# Patient Record
Sex: MALE | Race: WHITE
[De-identification: names, ages, dates, MRNs, and addresses within clinical notes are randomized per-mention and may not be internally consistent; named-entity substitution may affect disease eponyms.]

---

## 2018-06-27 ENCOUNTER — HOSPITAL ENCOUNTER (OUTPATIENT)
Dept: HOSPITAL 75 - CARD | Age: 78
LOS: 90 days | Discharge: HOME | End: 2018-09-25
Attending: INTERNAL MEDICINE
Payer: MEDICARE

## 2018-06-27 DIAGNOSIS — I45.10: Primary | ICD-10-CM

## 2018-06-27 DIAGNOSIS — I44.4: ICD-10-CM

## 2018-06-27 DIAGNOSIS — R42: ICD-10-CM

## 2018-06-27 PROCEDURE — 93225 XTRNL ECG REC<48 HRS REC: CPT

## 2018-06-27 PROCEDURE — 93226 XTRNL ECG REC<48 HR SCAN A/R: CPT

## 2018-07-19 ENCOUNTER — HOSPITAL ENCOUNTER (OUTPATIENT)
Dept: HOSPITAL 75 - SLEEP | Age: 78
Discharge: HOME | End: 2018-07-19
Attending: FAMILY MEDICINE
Payer: MEDICARE

## 2018-07-19 DIAGNOSIS — I10: ICD-10-CM

## 2018-07-19 DIAGNOSIS — R06.83: ICD-10-CM

## 2018-07-19 DIAGNOSIS — I49.9: ICD-10-CM

## 2018-07-19 DIAGNOSIS — G47.10: Primary | ICD-10-CM

## 2018-08-17 ENCOUNTER — HOSPITAL ENCOUNTER (OUTPATIENT)
Dept: HOSPITAL 75 - CATH | Age: 78
Discharge: HOME | End: 2018-08-17
Attending: INTERNAL MEDICINE
Payer: MEDICARE

## 2018-08-17 VITALS — WEIGHT: 211 LBS | HEIGHT: 72 IN | BODY MASS INDEX: 28.58 KG/M2

## 2018-08-17 VITALS — DIASTOLIC BLOOD PRESSURE: 89 MMHG | SYSTOLIC BLOOD PRESSURE: 146 MMHG

## 2018-08-17 DIAGNOSIS — I10: ICD-10-CM

## 2018-08-17 DIAGNOSIS — R94.31: ICD-10-CM

## 2018-08-17 DIAGNOSIS — J44.9: ICD-10-CM

## 2018-08-17 DIAGNOSIS — I45.2: ICD-10-CM

## 2018-08-17 DIAGNOSIS — R00.2: ICD-10-CM

## 2018-08-17 DIAGNOSIS — G47.33: ICD-10-CM

## 2018-08-17 DIAGNOSIS — Z79.01: ICD-10-CM

## 2018-08-17 DIAGNOSIS — R42: ICD-10-CM

## 2018-08-17 DIAGNOSIS — I48.92: Primary | ICD-10-CM

## 2018-08-17 DIAGNOSIS — I65.23: ICD-10-CM

## 2018-08-17 PROCEDURE — 33282: CPT

## 2018-08-17 NOTE — XMS REPORT
Encounter Summary

 Created on: 2018



Wachter, Ariel SUSHIL Laboy

External Reference #: [id number]

: 1940

Sex: Male



Demographics







 Address  [address] [place] [address] 

[place]  [address]

 

 Home Phone  [phone]

 

 Preferred Language  English

 

 Marital Status  Unknown

 

 Tenriism Affiliation  CAT

 

 Race  White

 

 Ethnic Group  Not  or 





Author







 Author  [facility]

 

 Organization  [facility]

 

 Address  Unknown

 

 Phone  Unavailable







Support







 Name  Relationship  Address  Phone

 

 Wachter,Mary Susan  ECON  Unknown  [phone]







Care Team Providers







 Care Team Member Name  Role  Phone

 

 SUSHIL Breen MD  Unavailable  [phone]

 

 Walt Malagon PA-C  Unavailable  [phone]

 

 Katt Garcia MD  PCP  [phone]

 

 Yasmine Caballero MD  Unavailable  [phone]







Encounter Details







    



  Date   Type   Department   Care Team   Description

 

    



  2018   Orders Only   [facility]   Tere Son MBBS   
Chronic kidney disease,



    Physicians - Internal   [address]   unspecified CKD stage



    Medicine   [address] 



    [place] [address] 



    [facility] [address]   [phone] 



    [address]   [phone] (Fax) 



    [place]  



    [address] [phone]  







Social History







    



  Tobacco Use   Types   Packs/Day   Years Used   Date

 

    



  Never Smoker   Cigars   

 

    



  Smokeless Tobacco: Former   Chew    Quit:



  User     2010









 Comments: Rare cigar w/ golf game









   



  Alcohol Use   Drinks/Week   oz/Week   Comments

 

   



  Yes   15 Standard   7.5 



   drinks or  



   equivalent  









 



  Sex Assigned at Birth   Date Recorded

 

 



  Not on file 



as of this encounter



Plan of Treatment





Not on fileas of this encounter



Results

* BASIC METABOLIC PANEL (2018)





   



  Component   Value   Ref Range   Performed At

 

   



  Sodium   140    [facility]

 

   



  Potassium   4.1    [facility]

 

   



  Chloride   105    [facility]

 

   



  CO2   27    [facility]

 

   



  Blood Urea Nitrogen   26    [facility]

 

   



  Creatinine   1.6 (H)    [facility]

 

   



  Glucose   86    [facility]

 

   



  Calcium   8.8    [facility]

 

   



  eGFR Non    45 (L)    [facility]

 

   



  eGFR      [facility]

 

   



  Anion Gap     [facility]













  Specimen

 





  Blood - Blood









 



  Narrative   Performed At

 

 



  This result has an attachment that is not available. 









   



  Performing Organization   Address   City/State/Zipcode   Phone Number

 

   



  [facility]   





in this encounter



Visit Diagnoses











  Diagnosis

 





  Chronic kidney disease, unspecified CKD stage

## 2018-08-17 NOTE — XMS REPORT
Continuity of Care Document

 Created on: 2018



WACHTER JR, ANDREW J

External Reference #: [id number]

: 1940

Sex: Male



Demographics







 Address  [address] [place]  [address]

 

 Home Phone  [phone] x

 

 Preferred Language  Unknown

 

 Marital Status  Unknown

 

 Zoroastrianism Affiliation  Unknown

 

 Race  Unknown

 

 Ethnic Group  Unknown





Author







 Author  [facility]

 

 Organization  [facility]

 

 Address  Unknown

 

 Phone  Unavailable



              



Allergies

      





 Active            Description            Code            Type            
Severity            Reaction            Onset            Reported/Identified   
         Relationship to Patient            Clinical Status        

 

 Yes            No Known Drug Allergies            [id number]            Drug 
Allergy            Unknown            N/A                         2016   
                               



                  



Medications

      



There is no data.                  



Problems

      





 Date Dx Coded            Attending            Type            Code            
Diagnosis            Diagnosed By        

 

 2009                         Ot            569.0            ANAL   
RECTAL POLYP                     

 

 2009                         Ot            V67.09            SURGERY 
FOLLOW-UP, OTHER SURGERY                     

 

 2014            NEELA RUANO MD            Ot            185         
   MALIGN NEOPL PROSTATE                     

 

 2014            NEELA RUANO MD            Ot            V16.0       
     FAMILY HX-GI MALIGNANCY                     

 

 2014            NEELA RUANO MD            Ot            V76.51      
      SCREEN MAL NEOP-COLON                     

 

 2014            MEENA NG, DUANE E            Ot            185          
  MALIGN NEOPL PROSTATE                     

 

 2016                         Ot            185            MALIGN NEOPL 
PROSTATE                     

 

 2016            NICKI NG, VERONIQUE MARTINEZ            Ot            L98.9     
       DISORDER OF THE SKIN AND SUBCUTANEOUS TI                     

 

 2016            NICKI GN, VERONIQUE MARTINEZ            Ot            Z01.818   
         ENCOUNTER FOR OTHER PREPROCEDURAL EXAMIN                     

 

 2016                         Ot            275.40            CALCIUM 
METAB DISORD,NOS                     

 

 2016                         Ot            285.21            ANEMIA IN 
CHRONIC KIDNEY DISEASE                     

 

 2016                         Ot            585.3            CHRONIC 
KIDNEY DISEASE, STAGE III (MODER                     

 

 2016                         Ot            599.70            HEMATURIA, 
UNSPECIFIED                     

 

 2016            TAWIL MD, ELIAS A            Ot            185          
  MALIGN NEOPL PROSTATE                     

 

 2016            TAWIL MD, ELIAS A            Ot            573.8        
    LIVER DISORDERS NEC                     

 

 2016            TAWIL MD, ELIAS A            Ot            753.10       
     CYSTIC KIDNEY DISEASE, UNSPECIFIED                     

 

 2016            TAWIL MD, ELIAS A            Ot            V81.5        
    SCREEN FOR NEPHROPATHY                     

 

 2016            NEELA RUANO MD            Ot            V72.84      
      EXAM PRE-OPERATIVE NOS                     

 

 2016                         Ot            185            MALIGN NEOPL 
PROSTATE                     

 

 2016            NICKI NG, VERONIQUE MARTINEZ            Ot            C44.42    
        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                     

 

 2016            NICKI NG, VERONIQUE MARTINEZ            Ot            L57.0     
       ACTINIC KERATOSIS                     

 

 2016            NICKI NG, VERONIQUE MARTINEZ            Ot            L82.1     
       OTHER SEBORRHEIC KERATOSIS                     

 

 2016            NICKI NG, VERONIQUE MARTINEZ            Ot            C44.42    
        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                     

 

 2016            NICKI NG, VERONIQUE MARTINEZ            Ot            L82.1     
       OTHER SEBORRHEIC KERATOSIS                     

 

 11/10/2016            DOMINIQUE TANG MD            Ot            I44.4       
     LEFT ANTERIOR FASCICULAR BLOCK                     

 

 11/10/2016            DOMINIQUE TANG MD            Ot            I45.10      
      UNSPECIFIED RIGHT BUNDLE-BRANCH BLOCK                     

 

 11/10/2016            DOMINIQUE TANG MD            Ot            I95.9       
     HYPOTENSION, UNSPECIFIED                     

 

 11/10/2016            DOMINIQUE TANG MD            Ot            R94.31      
      ABNORMAL ELECTROCARDIOGRAM [ECG] [EKG]                     

 

 11/10/2016            DOMINIQUE TANG MD            Ot            I44.4       
     LEFT ANTERIOR FASCICULAR BLOCK                     

 

 11/10/2016            DOMINIQUE TANG MD            Ot            I45.10      
      UNSPECIFIED RIGHT BUNDLE-BRANCH BLOCK                     

 

 11/10/2016            DOMINIQUE TANG MD            Ot            I95.9       
     HYPOTENSION, UNSPECIFIED                     

 

 11/10/2016            DOMINIQUE TANG MD            Ot            R94.31      
      ABNORMAL ELECTROCARDIOGRAM [ECG] [EKG]                     

 

 2016            DOMINIQUE TANG MD            Ot            I44.4       
     LEFT ANTERIOR FASCICULAR BLOCK                     

 

 2016            DOMINIQUE TANG MD            Ot            I45.10      
      UNSPECIFIED RIGHT BUNDLE-BRANCH BLOCK                     

 

 2016            DOMINIQUE TANG MD            Ot            I95.9       
     HYPOTENSION, UNSPECIFIED                     

 

 2016            DOMINIQUE TANG MD            Ot            R94.31      
      ABNORMAL ELECTROCARDIOGRAM [ECG] [EKG]                     

 

 2016            DOMINIQUE TANG MD            Ot            I44.4       
     LEFT ANTERIOR FASCICULAR BLOCK                     

 

 2016            DOMINIQUE TANG MD            Ot            I45.10      
      UNSPECIFIED RIGHT BUNDLE-BRANCH BLOCK                     

 

 2016            DOMINIQUE TANG MD            Ot            I95.9       
     HYPOTENSION, UNSPECIFIED                     

 

 2016            DOMINIQUE TANG MD            Ot            R94.31      
      ABNORMAL ELECTROCARDIOGRAM [ECG] [EKG]                     

 

 2018                         Ot            275.40            CALCIUM 
METAB DISORD,NOS                     

 

 2018                         Ot            285.21            ANEMIA IN 
CHRONIC KIDNEY DISEASE                     

 

 2018                         Ot            585.3            CHRONIC 
KIDNEY DISEASE, STAGE III (MODER                     

 

 2018                         Ot            599.70            HEMATURIA, 
UNSPECIFIED                     

 

 2018            TAWIL MD, ELIAS A            Ot            185          
  MALIGN NEOPL PROSTATE                     

 

 2018            TAWIL MD, ELIAS A            Ot            573.8        
    LIVER DISORDERS NEC                     

 

 2018            TAWIL MD, ELIAS A            Ot            753.10       
     CYSTIC KIDNEY DISEASE, UNSPECIFIED                     

 

 2018            TAWIL MD, ELIAS A            Ot            V81.5        
    SCREEN FOR NEPHROPATHY                     

 

 2018            ALDEN NG, NEELA SALVADOR            Ot            V72.84      
      EXAM PRE-OPERATIVE NOS                     

 

 2018                         Ot            185            MALIGN NEOPL 
PROSTATE                     

 

 2018            DOMINIQUE TANG MD            Ot            I44.4       
     LEFT ANTERIOR FASCICULAR BLOCK                     

 

 2018            DOMINIQUE TANG MD            Ot            I45.10      
      UNSPECIFIED RIGHT BUNDLE-BRANCH BLOCK                     

 

 2018            DOMINIQUE TANG MD            Ot            I95.9       
     HYPOTENSION, UNSPECIFIED                     

 

 2018            DOMINIQUE TANG MD            Ot            R94.31      
      ABNORMAL ELECTROCARDIOGRAM [ECG] [EKG]                     

 

 2018            DIMITRI FARRAR MD            Ot            G47.10    
        HYPERSOMNIA, UNSPECIFIED                     

 

 2018            DIMITRI FARRAR MD            Ot            I10       
     ESSENTIAL (PRIMARY) HYPERTENSION                     

 

 2018            DIMITRI FARRAR MD            Ot            I49.9     
       CARDIAC ARRHYTHMIA, UNSPECIFIED                     

 

 2018            DIMITRI FARRAR MD            Ot            R06.83    
        SNORING                     

 

 2018            DOMINIQUE TANG MD            Ot            I44.4       
     LEFT ANTERIOR FASCICULAR BLOCK                     

 

 2018            DOMINIQUE TANG MD            Ot            I45.10      
      UNSPECIFIED RIGHT BUNDLE-BRANCH BLOCK                     

 

 2018            DOMINIQUE TANG MD            Ot            R42         
   DIZZINESS AND GIDDINESS                     

 

 2018            DOMINIQUE TANG MD            Ot            I08.1       
     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                     

 

 2018            DOMINIQUE TANG MD            Ot            I44.4       
     LEFT ANTERIOR FASCICULAR BLOCK                     

 

 2018            DOMINIQUE TANG MD            Ot            I45.10      
      UNSPECIFIED RIGHT BUNDLE-BRANCH BLOCK                     

 

 2018            DOMINIQUE TANG MD            Ot            R42         
   DIZZINESS AND GIDDINESS                     



                                                                               
                                                                   



Procedures

      



There is no data.                  



Results

      



There is no data.              



Encounters

      





 ACCT No.            Visit Date/Time            Discharge            Status    
        Pt. Type            Provider            Facility            Loc./Unit  
          Complaint        

 

 [id number]            2018 15:00:00            2018 15:30:00    
        DIS            Outpatient            DIMITRI FARRAR MD            
[facility]            SLEEP            SLEEP DISTURBANCE 
       

 

 [id number]            07/10/2018 11:00:00            07/10/2018 23:59:59    
        CLS            Preadmit            JUAN AGUIRRE MD            [facility]            RAD            ATRIAL FLUTTER,PAROXYSMAL
,DIZZINESS,LAFB        

 

 [id number]            2018 11:55:00            2018 23:59:59    
        CLS            Outpatient            DOMINIQUE TANG MD            [facility]            CARD            RBBB,LAFB,DIZZINESS    
    

 

 [id number]            2018 11:53:00            2018 23:59:59    
        CLS            Outpatient            DOMINIQUE TANG MD            [facility]            CARD            RBBB,LAFB,DIZZINESS    
    

 

 [id number]            2016 13:01:00            2016 23:59:59    
        CLS            Outpatient            DOMINIQUE TANG MD            [facility]            CARD            RBBB, ABNORMAL EKG     
   

 

 [id number]            2016 06:06:00            2016 09:29:00    
        DIS            Outpatient            VERONIQUE CACERES MD            
[facility]            SKIN LESIONS        

 

 [id number]            2016 12:30:00            2016 12:35:00    
        DIS            Outpatient            VERONIQUE CACERES MD            
[facility]            PREOP            SKIN LESIONS      
  

 

 [id number]            2014 08:48:00            2014 00:01:00    
        DIS            Outpatient            MYERS MD, DUANE E            [facility]            ONC                     

 

 [id number]            2014 07:29:00            2014 09:45:00    
        DIS            Outpatient            [facility]            [facility]        

 

 [id number]            2014 07:24:00            2014 23:59:59    
        CLS            Outpatient            NEELA RUANO MD            [facility]            PREOP            SCREENING        

 

 [id number]            2014 10:52:00            2014 23:59:59    
        CLS            Outpatient            TAWIL MD, ELIAS A            [facility]            RAD            PROSTATE CA        

 

 [id number]            2014 00:00:00                                   
   Document Registration                                                       
     

 

 [id number]            2013 07:30:00                                   
   Document Registration                                                       
     

 

 [id number]            2009 07:28:00                                   
   Document Registration                                                       
     

 

 0000            2017 08:18:55            2017 23:59:59            
CLS            Outpatient

## 2018-08-17 NOTE — XMS REPORT
Clinical Summary

 Created on: 2018



Wachter, Andrew J Jr

External Reference #: [id number]

: 1940

Sex: Male



Demographics







 Address  [address] [place] [address] 

[place]  [address]

 

 Home Phone  [phone]

 

 Preferred Language  English

 

 Marital Status  Unknown

 

 Tenriism Affiliation  CAT

 

 Race  White

 

 Ethnic Group  Not  or 





Author







 Author  [facility]

 

 Organization  [facility]

 

 Address  Unknown

 

 Phone  Unavailable







Support







 Name  Relationship  Address  Phone

 

 Wachter,Mary Susan  ECON  Unknown  [phone]







Care Team Providers







 Care Team Member Name  Role  Phone

 

 SUSHIL Breen MD  Unavailable  [phone]

 

 Walt Malagon PA-C  Unavailable  [phone]

 

 Katt Garcia MD  PCP  [phone]

 

 Yasmine Caballero MD  Unavailable  [phone]







Source Comments

Some departments are not documenting in the electronic medical record.  If you 
do not see the information that you expected, contact Release of Information in 
the Health Information Management department at [phone] for further 
assistance in locating additional records.[facility]



Allergies

No Known Allergies



Current Medications







      



  Prescription   Sig.   Disp.   Refills   Start   End Date   Status



      Date  

 

      



  diphenhydrAMINE   Take 25 mg by mouth at       Active



  (BENADRYL) 25 mg capsule   bedtime as needed.     

 

      



  cholecalciferol (VITAMIN   Take 2,000 Units by mouth       Active



  D) 1,000 units tablet   daily.     

 

      



  aspirin EC 81 mg tablet   Take 81 mg by mouth       Active



   daily. Take with food.     







Active Problems







 



  Problem   Noted Date

 

 



  CKD (chronic kidney disease) stage 3, GFR 30-59 ml/min ([facility])   2014

 

 



  Last Assessment & Plan:



  Continue to f/u w/ [facility].

 

 



  History of prostate cancer   2014

 

 



  Overview:



  PSA ()=5.3 ng/mL.



  PNBx (): cT1c; (L) [place] 3+4=7, 1/6 cores; (L) [place] 3+3=6,



  1/6 cores; Dr. Tawil.



  (B) Nerve Sparing Robotic Asst Lap Prostatectomy (RALP) -- 2014; Dr. Breen.



  pT2c Nx Mx, [place] 3+4=7, Margins Negative.





  L



  ast Assessment & Plan:



  PSA reviewed & remains at undetectable level.



  RTC 1 yr w/ PSA ~ 2-3 hrs prior to appt.

 

 



  Erectile dysfunction following radical prostatectomy 

 

 



  Overview:



  (+)baseline ED prior to prostatectomy.



  Tried Cialis 5 mg w/o satisfactory results.



  Tried Viagra 100 mg w/ satisfactory results.





  L



  ast Assessment & Plan:



  No need for additional meds at this time.

 

 



  Obesity (BMI 30-39.9) 

 

 



  Renal cyst, left 

 

 



  Last Assessment & Plan:



  Followed by Nephrology.



  Reviewed US results w/ pt today.







Encounters







    



  Date   Type   Specialty   Care Team   Description

 

    



  2018   Orders Only   Nephrology   Tere [facility]   Chronic kidney 
disease,



      unspecified CKD stage

 

    



  2018   Telephone   Nephrology   Tere [facility]   Other (Lab 
Results )

 

    



  2018   Orders Only   Nephrology   Tere [facility] 



from Last 3 Months



Family History







   



  Medical History   Relation   Name   Comments

 

   



  Heart Attack   Father  

 

   



  Cancer   Other  

 

   



  Cancer   Paternal  



   Grandfather  

 

   



  Cancer   Paternal  



   Grandmother  









   



  Relation   Name   Status   Comments

 

   



  Father   

 

   



  Other   

 

   



  Paternal Grandfather   

 

   



  Paternal Grandmother   







Social History







    



  Tobacco Use   Types   Packs/Day   Years Used   Date

 

    



  Never Smoker   Cigars   

 

    



  Smokeless Tobacco: Former   Chew    Quit:



  User     2010









 Tobacco Cessation: Counseling Given: No

Comments: Rare cigar w/ golf game









   



  Alcohol Use   Drinks/Week   oz/Week   Comments

 

   



  Yes   15 Standard   7.5 



   drinks or  



   equivalent  









 



  Sex Assigned at Birth   Date Recorded

 

 



  Not on file 







Last Filed Vital Signs







  



  Vital Sign   Reading   Time Taken

 

  



  Blood Pressure   111/60   2018  2:38 PM CDT

 

  



  Pulse   85   2018  2:38 PM CDT

 

  



  Temperature   36.7 C (98.1 F)   10/12/2017  1:00 PM CDT

 

  



  Respiratory Rate   -   -

 

  



  Oxygen Saturation   95%   2014 11:27 AM CDT

 

  



  Inhaled Oxygen   -   -



  Concentration  

 

  



  Weight   95.7 kg (211 lb)   2018  2:38 PM CDT

 

  



  Height   182.9 cm (6')   2018  2:38 PM CDT

 

  



  Body Mass Index   28.62   2018  2:38 PM CDT







Plan of Treatment







   



  Health Maintenance   Due Date   Last Done   Comments

 

   



  PHYSICAL (COMPREHENSIVE)   1949  



  EXAM   

 

   



  PERTUSSIS VACCINE   1953  

 

   



  TETANUS VACCINE   1959  

 

   



  COLORECTAL CANCER   1992  



  SCREENING   

 

   



  SHINGLES RECOMBINANT   1992  



  VACCINE (1 of 2)   

 

   



  PNEUMONIA (PCV13/PPSV23)   2007  



  VACCINES (1 of 2 - PCV13)   

 

   



  INFLUENZA VACCINE   10/01/2018   10/06/2010, 2009, 10/22/2008, 



    Additional history exists 







Results

* BASIC METABOLIC PANEL (2018)





   



  Component   Value   Ref Range   Performed At

 

   



  Sodium   140    [facility]

 

   



  Potassium   4.1    [facility]

 

   



  Chloride   105    [facility]

 

   



  CO2   27    [facility]

 

   



  Blood Urea Nitrogen   26    [facility]

 

   



  Creatinine   1.6 (H)    [facility]

 

   



  Glucose   86    [facility]

 

   



  Calcium   8.8    [facility]

 

   



  eGFR Non    45 (L)    [facility]

 

   



  eGFR      [facility]

 

   



  Anion Gap     [facility]













  Specimen

 





  Blood - Blood









 



  Narrative   Performed At

 

 



  This result has an attachment that is not available. 









   



  Performing Organization   Address   City/State/Zipcode   Phone Number

 

   



  [facility]   





* 25-OH VITAMIN D (D2 + D3) (2018)





   



  Component   Value   Ref Range   Performed At

 

   



  Vitamin D(25-OH)Total   55.23    [facility]













  Specimen

 





  Blood - Blood









 



  Narrative   Performed At

 

 



  This result has an attachment that is not available. 









   



  Performing Organization   Address   City/State/Zipcode   Phone Number

 

   



  [facility]   





* CBC AND DIFF (2018)





   



  Component   Value   Ref Range   Performed At

 

   



  White Blood Cells   5.99    [facility]

 

   



  RBC   4.17    [facility]

 

   



  Hemoglobin   13.6    [facility]

 

   



  Hematocrit   41.3    [facility]

 

   



  MCV   99.0    [facility]

 

   



  MCH   32.6    [facility]

 

   



  MCHC   32.9    [facility]

 

   



  Platelet Count   235    [facility]

 

   



  RDW   13.3    [facility]

 

   



  Neutrophils   59.7    [facility]

 

   



  Absolute Neutrophil Count   3.57    [facility]

 

   



  Lymphocytes   26.9    [facility]

 

   



  Absolute Lymph Count   1.61    [facility]

 

   



  Monocytes   9.3    [facility]

 

   



  Absolute Monocyte Count   0.6    [facility]

 

   



  Eosinophil   3.3    [facility]

 

   



  Absolute Eosinophil Count   0.2    [facility]

 

   



  Basophils   0.8    [facility]

 

   



  Absolute Basophil Count   0.1    [facility]













  Specimen

 





  Blood - Blood









 



  Narrative   Performed At

 

 



  This result has an attachment that is not available. 









   



  Performing Organization   Address   City/State/Zipcode   Phone Number

 

   



  [facility]   





* COMPREHENSIVE METABOLIC PANEL (2018)





   



  Component   Value   Ref Range   Performed At

 

   



  Sodium   139    [facility]

 

   



  Potassium   4.0    [facility]

 

   



  Chloride   102    [facility]

 

   



  CO2   29.0    [facility]

 

   



  Blood Urea Nitrogen   28    [facility]

 

   



  Creatinine   1.9    [facility]

 

   



  Glucose   102    [facility]

 

   



  Calcium   9.1    [facility]

 

   



  Total Protein   6.8    [facility]

 

   



  Total Bilirubin   0.6    [facility]

 

   



  Albumin   4.1    [facility]

 

   



  Alk Phosphatase   49    [facility]

 

   



  AST (SGOT)   29    [facility]

 

   



  ALT (SGPT)   29    [facility]

 

   



  eGFR Non    38    [facility]

 

   



  eGFR      [facility]

 

   



  Anion Gap   12    [facility]













  Specimen

 





  Blood - Blood









 



  Narrative   Performed At

 

 



  This result has an attachment that is not available. 









   



  Performing Organization   Address   City/State/Zipcode   Phone Number

 

   



  [facility]   





from Last 3 Months

## 2018-08-17 NOTE — XMS REPORT
CCD document using C-CDA

 Created on: 2018



Wachter Jr, Andrew J

External Reference #: [id number]

: 1940

Sex: Male



Demographics







 Address  [address] [place]  [address]

 

 Home Phone  [phone]

 

 Preferred Language  English

 

 Marital Status  Unknown

 

 Druze Affiliation  Unknown

 

 Race  White

 

 Ethnic Group  Not  or 





Author







 Author  Katt Garcia MD, [facility]

 

 Address  [address] [place]  [address]



 

 Phone  [phone]







Care Team Providers







 Care Team Member Name  Role  Phone

 

  PP  Unavailable

 

  CCM  Unavailable



                                            



Summary Purpose

          Interface Exchange                                                   
                 



Insurance Providers

                      





 Payer name                    Policy type / Coverage type                    
Covered party ID                    Effective Begin Date                    
Effective End Date                

 

 [employer] Medicare Part B                    Medicare Part B                    
[id number]                    [id number]                    Unknown              
  

 

 [facility]                    Medicare Part B           
         [id number]                    [id number]                    Unknown   
             



                                                                               
         



Family history

                      



Son            





 Diagnosis                    Age At Onset                

 

 Depression                    Unknown                



            



Father            





 Diagnosis                    Age At Onset                

 

 No Family Disease Entered                    N/A                



            



Runs in the family            





 Diagnosis                    Age At Onset                

 

 No Family Disease Entered                    N/A                



                                                                               
                   



Social History

                      





 Social History Element                    Codes                    Description
                    Effective Dates                

 

 Marital status                    Unknown                              
          2012                

 

 Number of children                    Unknown                    3            
        2012                

 

 Employment                    Unknown                    Currently employed

/                    2012                

 

 Tobacco history                    SNOMED CT: [id number]                    Quit 
less than 5 years ago

quit using snuff in 2012                



                                                                               
                                       



Allergies, Adverse Reactions, Alerts

          Allergies, Adverse Reactions, Alerts data not found                  
                                                  



Past Medical History

                      





 Illness                    Codes                    Condition Status          
          Onset Date                    Resolved Date                

 

                     Mixed hyperlipidemia                                      
ICD-9: 272.2

ICD-10: E78.2                    Active                    2017          
          Unknown                

 

                     Personal history of malignant neoplasm of prostate        
                              ICD-9: V10.46

ICD-10: Z85.46                    Active                    2018         
           Unknown                

 

                     Vitamin D deficiency, unspecified                         
             ICD-9: 268.9

ICD-10: E55.9                    Active                    2017          
          Unknown                

 

                     Actinic keratosis                                      ICD-
9: 702.0

ICD-10: L57.0                    Active                    2017          
          Unknown                

 

                     Encounter for general adult medical examination without 
abnormal findings                                      ICD-9: V70.0

ICD-10: Z00.00                    Active                    2015         
           Unknown                

 

                     Inflamed seborrheic keratosis                             
         ICD-9: 702.11

ICD-10: L82.0                    Active                    2017          
          Unknown                

 

                     Encounter for general adult medical examination with 
abnormal findings                                      ICD-9: V70.0

ICD-10: Z00.01                    Active                    2015         
           Unknown                

 

                     Encounter for immunization                                
      ICD-9: V06.6

ICD-10: Z23                    Active                    2016            
        Unknown                

 

                     Irritated nevus of left upper arm                         
             ICD-9: 216.6                    Active                    2015                    Unknown                

 

                     Routine medical exam                                      
ICD-9: V70.0                    Active                    2015           
         Unknown                

 

                     Hyperlipidemia                                      
Unknown                    Active                    2013                
    Unknown                

 

                     Hypertension                                      Unknown 
                   Active                    2013                    
Unknown                

 

                     Elevated PSA measurement                                  
    ICD-9: 790.93                    Active                    2013      
              Unknown                

 

                     HYPERLIPIDEMIA                                      ICD-9: 
272.4                    Active                    2013                  
  Unknown                

 

                     Blood glucose elevated                                    
  ICD-9: 790.29                    Active                    2013        
            Unknown                

 

                     Prostate cancer screening                                 
     ICD-9: V76.44                    Active                    2013     
               Unknown                

 

                     ACUTE URI                                      ICD-9: 
465.9                    Active                    2012                  
  Unknown                

 

                     Actinic keratosis                                      ICD-
9: 702.0                    Active                    2012               
     Unknown                

 

                     Renal insufficiency                                      
ICD-9: 593.9                    Active                    2012           
         Unknown                

 

                     Changing mole                                      ICD-9: 
216.9                    Active                    2012                  
  Unknown                

 

                     Seborrheic keratosis, inflamed                            
          ICD-9: 702.11                    Active                    2012
                    Unknown                

 

                     Cough                                      ICD-9: 786.2   
                 Active                    2012                    
Unknown                

 

                     HEARING LOSS                                      ICD-9: 
389.9                    Active                    2012                  
  Unknown                

 

                     Impacted cerumen                                      ICD-9
: 380.4                    Active                    2012                
    Unknown                

 

                     Lumbago                                      ICD-9: 724.2 
                   Active                    2012                    
Unknown                

 

                     Overweight                                      ICD-9: 
278.02                    Active                    2012                 
   Unknown                

 

                     PITYRIASIS VERSICOLOR                                      
ICD-9: 111.0                    Active                    2012           
         Unknown                

 

                     Skin lesions, generalized                                 
     ICD-9: 709.9                    Active                    2012      
              Unknown                



                                                                               
                                                                               
                                                                               
                                                                               
                                          



Problems

                      





 Condition                    Codes                    Effective Dates         
           Condition Status                

 

                     Mixed hyperlipidemia                                      
ICD-9: 272.2

ICD-10: E78.2                    2017                    Active          
      

 

                     Personal history of malignant neoplasm of prostate        
                              ICD-9: V10.46

ICD-10: Z85.46                    2018                    Active         
       

 

                     Vitamin D deficiency, unspecified                         
             ICD-9: 268.9

ICD-10: E55.9                    2017                    Active          
      

 

                     Actinic keratosis                                      ICD-
9: 702.0

ICD-10: L57.0                    2017                    Active          
      

 

                     Encounter for general adult medical examination without 
abnormal findings                                      ICD-9: V70.0

ICD-10: Z00.00                    2015                    Active         
       

 

                     Inflamed seborrheic keratosis                             
         ICD-9: 702.11

ICD-10: L82.0                    2017                    Active          
      

 

                     Encounter for general adult medical examination with 
abnormal findings                                      ICD-9: V70.0

ICD-10: Z00.01                    2015                    Active         
       

 

                     Encounter for immunization                                
      ICD-9: V06.6

ICD-10: Z23                    2016                    Active            
    

 

                     Irritated nevus of left upper arm                         
             ICD-9: 216.6                    2015                    
Active                

 

                     Routine medical exam                                      
ICD-9: V70.0                    2015                    Active           
     

 

                     Hyperlipidemia                                      
Unknown                    2013                    Active                

 

                     Hypertension                                      Unknown 
                   2013                    Active                

 

                     Elevated PSA measurement                                  
    ICD-9: 790.93                    2013                    Active      
          

 

                     HYPERLIPIDEMIA                                      ICD-9: 
272.4                    2013                    Active                

 

                     Blood glucose elevated                                    
  ICD-9: 790.29                    2013                    Active        
        

 

                     Prostate cancer screening                                 
     ICD-9: V76.44                    2013                    Active     
           

 

                     ACUTE URI                                      ICD-9: 
465.9                    2012                    Active                

 

                     Actinic keratosis                                      ICD-
9: 702.0                    2012                    Active                

 

                     Renal insufficiency                                      
ICD-9: 593.9                    2012                    Active           
     

 

                     Changing mole                                      ICD-9: 
216.9                    2012                    Active                

 

                     Seborrheic keratosis, inflamed                            
          ICD-9: 702.11                    2012                    Active
                

 

                     Cough                                      ICD-9: 786.2   
                 2012                    Active                

 

                     HEARING LOSS                                      ICD-9: 
389.9                    2012                    Active                

 

                     Impacted cerumen                                      ICD-9
: 380.4                    2012                    Active                

 

                     Lumbago                                      ICD-9: 724.2 
                   2012                    Active                

 

                     Overweight                                      ICD-9: 
278.02                    2012                    Active                

 

                     PITYRIASIS VERSICOLOR                                      
ICD-9: 111.0                    2012                    Active           
     

 

                     Skin lesions, generalized                                 
     ICD-9: 709.9                    2012                    Active      
          



                                                                               
                                                                               
                                                                               
                                                                               
                                          



Medications

                      





 Medication                    Codes                    Instructions           
         Start Date                    Stop Date                    Status     
               Fill Instructions                

 

                     Efudex 5 % topical cream                                  
    RxNorm: 207062                    1 Application TOP BID                    
2017                    Inactive           
                         

 

                     triamcinolone acetonide 0.1 % topical ointment            
                          RxNorm: 3677483                    1 Application TOP 
TID                    2017                
    Inactive                                    

 

                     triamcinolone acetonide 0.1 % topical ointment            
                          RxNorm: 1770227                    1 Application TOP 
TID                    2017                
    Inactive                                    

 

                     fluorouracil 5 % topical cream                            
          RxNorm: 606362                    TOP BID to affected area x 14 days 
                   2015                    
Inactive                                    

 

                     fluorouracil 5 % topical cream                            
          RxNorm: 214989                    TOP BID to affected area x 14 days 
                   2015                    03/10/2015                    
Inactive                                    

 

                     ivermectin 3 mg tablet                                    
  RxNorm: 236528                    7 Tablet(s) PO daily may repeat on day 8 if 
needed                    08/10/2012                    2012             
       Inactive                                    

 

                     ivermectin 3 mg tablet                                    
  RxNorm: 059515                    7 Tablet(s) PO daily may repeat on day 8 if 
needed                    08/10/2012                    08/10/2012             
       Inactive                                    

 

                     ketoconazole 2 % Shampoo                                  
    RxNorm: 304621                    1 Application TOP TIW apply from head to 
toe, leave on for 5 minutes, then wash off, do three times a week x 2 weeks.   
                 2012                    
Inactive                                    

 

                     ketoconazole 2 % Shampoo                                  
    RxNorm: 983579                    1 Application TOP TIW apply from head to 
toe, leave on for 5 minutes, then wash off, do three times a week x 2 weeks.   
                 2012                    
Inactive                                    

 

                     Sleep Easy oral                                      RxNorm
: 34339                    oral                    No Start Date               
                          Active                                    

 

                     Vitamin D3 oral                                      RxNorm
: 2418                    oral                    No Start Date                
                         Active                                    

 

                     Zithromax Z-Steven 250 mg tablet                             
         RxNorm: 246215                    Tablet(s) PO                    No 
Start Date                    2013                    Inactive           
                         

 

                     Fish Oil Oral                                      RxNorm:
                     Oral                    No Start Date                                        Inactive                                    

 

                     aspirin 81 mg Cap, Delayed Release                        
              RxNorm: 593981                    1 Capsule(s) PO daily          
          No Start Date                    2014                    
Inactive                                    



                                                                               
                                                                               
                                                  



Medication Administered

          No Medication Administered data                                      
                              



Immunizations

                      





 Vaccine                    Codes                    Date                    
Status                

 

 Pneumococcal                    CVX: 133                    10/27/2016        
            completed                

 

 Tetanus, Diptheria, Pertussis                    CVX: 113                    10
/                    completed                

 

 Tetanus/Diptheria                    CVX: 113                    10/27/2016   
                 completed                

 

 Zoster                    CVX: 121                    2016              
      completed                

 

 Pneumococcal (Adult)                    CVX: 133                    2016
                    completed                

 

 Influenza                    CVX: 141                    10/15/2013           
         completed                

 

 Influenza                    CVX: 141                    01/10/2013           
         completed                



                                                                               
                                                           



Assessments

                      





 Condition                    Codes                    Effective Dates         
       

 

 Personal history of malignant neoplasm of prostate                    ICD-10: 
Z85.46

ICD-9: V10.46                    2018                

 

 Vitamin D deficiency, unspecified                    ICD-10: E55.9

ICD-9: 268.9                    2018                

 

 Mixed hyperlipidemia                    ICD-10: E78.2

ICD-9: 272.2                    2018                

 

 Actinic keratosis                    ICD-10: L57.0

ICD-9: 702.0                    2017                

 

 Inflamed seborrheic keratosis                    ICD-10: L82.0

ICD-9: 702.11                    2017                

 

 Encounter for general adult medical examination without abnormal findings     
               ICD-10: Z00.00

ICD-9: V70.0                    2017                

 

 Encounter for general adult medical examination with abnormal findings        
            ICD-10: Z00.01

ICD-9: V70.0                    2016                

 

 Encounter for immunization                    ICD-10: Z23

ICD-9: V06.6                    2016                

 

 Irritated nevus of left upper arm                    ICD-9: 216.6             
       2015                

 

 Routine medical exam                    ICD-9: V70.0                    2015                

 

 HYPERLIPIDEMIA                    ICD-9: 272.4                    2014  
              

 

 Elevated PSA                    ICD-9: 790.93                    2014   
             

 

 BENIGN ZBIGNIEW SKIN                    ICD-9: 216.9                    2013 
               

 

 SKIN DISORDER                    ICD-9: 709.9                    2013   
             

 

 HEARING LOSS                    ICD-9: 389.9                    2013    
            

 

 RENAL & URETERAL DIS NOS                    ICD-9: 593.9                                    

 

 Prostate cancer screening                    ICD-9: V76.44                    
2013                

 

 Blood glucose elevated                    ICD-9: 790.29                                    

 

 ACUTE URI                    ICD-9: 465.9                    2012       
         

 

 Actinic keratosis                    ICD-9: 702.0                    2012                

 

 Seborrheic keratosis, inflamed                    ICD-9: 702.11               
     2012                

 

 Impacted cerumen                    ICD-9: 380.4                    2012
                

 

 Overweight                    ICD-9: 278.02                    2012     
           

 

 Lumbago                    ICD-9: 724.2                    2012         
       

 

 PITYRIASIS VERSICOLOR                    ICD-9: 111.0                    2012                

 

 Cough                    ICD-9: 786.2                    2012           
     



                                                                    



Reason For Visit

                      





 Reason For Visit                    Effective Dates                    Notes  
              

 

 hyperlipidemia                    2018                                  
  

 

 hyperlipidemia                    2017                                  
  

 

 well man exam (65+ years)                    2017                       
             

 

 skin lesion                    2016                                    

 

 cough                    2016                                    

 

 office procedure                    2015                    here for 
skin lesion removal and biopsy of area.                 

 

 hyperlipidemia                    2015                    Right arm 
deltoid area                 

 

 hyperlipidemia                    2014                                  
  

 

 skin lesion                    2013                                    

 

 hyperlipidemia                    2013                                  
  

 

 hoarseness                    2012                                    

 

 skin lesion                    2012                    on neck behind 
right ear, pt states that it itches, is painful, and his pike hits it when he 
cuts his hair                

 

 skin lesion                    2012                                    

 

 dyspnea                    2012                                    



                                                                              



Results

          No Results data                                                      
    



Review of Systems

                      





 System                    Result                    Effective Dates           
     

 

 Constitutional                    No night sweats                    2018                

 

 Constitutional                    No chills                    2018     
           

 

 Constitutional                    No fever                    2018      
          

 

 Eyes                    No vision change                    2018        
        

 

 Ears/Nose/Throat/Neck                    No dizziness                    2018                

 

 Ears/Nose/Throat/Neck                    No headache                    2018                

 

 Ears/Nose/Throat/Neck                    hearing loss                    2018                

 

 Cardiovascular                    No chest pain/pressure                                    

 

 Respiratory                    No cough                    2018         
       

 

 Gastrointestinal                    No abdominal pain                    2018                

 

 Gastrointestinal                    No constipation                    2018                

 

 Gastrointestinal                    No diarrhea                    2018 
               

 

 Genitourinary/Nephrology                    No urinary urgency                
    2018                

 

 Genitourinary/Nephrology                    No urinary incontinence           
         2018                

 

 Musculoskeletal                    No stiffness                    2018 
               

 

 Musculoskeletal                    No arthralgia(s)                    2018                

 

 Dermatologic                    rash                    2018            
    

 

 Dermatologic                    No scar                    2018         
       

 

 Neurologic                    No dyskinesia or tremor                    2018                

 

 Psychiatric                    No anxiety                    2018       
         

 

 Constitutional                    No night sweats                    2017                

 

 Constitutional                    No chills                    2017     
           

 

 Constitutional                    No fever                    2017      
          

 

 Eyes                    No vision change                    2017        
        

 

 Ears/Nose/Throat/Neck                    No dizziness                    2017                

 

 Ears/Nose/Throat/Neck                    No headache                    2017                

 

 Ears/Nose/Throat/Neck                    hearing loss                    2017                

 

 Cardiovascular                    No chest pain/pressure                                    

 

 Respiratory                    No cough                    2017         
       

 

 Gastrointestinal                    No abdominal pain                    2017                

 

 Gastrointestinal                    No constipation                    2017                

 

 Gastrointestinal                    No diarrhea                    2017 
               

 

 Genitourinary/Nephrology                    No urinary urgency                
    2017                

 

 Genitourinary/Nephrology                    No urinary incontinence           
         2017                

 

 Musculoskeletal                    No stiffness                    2017 
               

 

 Musculoskeletal                    No arthralgia(s)                    2017                

 

 Dermatologic                    rash                    2017            
    

 

 Dermatologic                    No scar                    2017         
       

 

 Neurologic                    No dyskinesia or tremor                    2017                

 

 Psychiatric                    No anxiety                    2017       
         

 

 Constitutional                    No night sweats                    2017                

 

 Constitutional                    No chills                    2017     
           

 

 Constitutional                    No fever                    2017      
          

 

 Eyes                    No vision change                    2017        
        

 

 Ears/Nose/Throat/Neck                    No dizziness                    2017                

 

 Ears/Nose/Throat/Neck                    No headache                    2017                

 

 Ears/Nose/Throat/Neck                    hearing loss                    2017                

 

 Cardiovascular                    No chest pain/pressure                                    

 

 Respiratory                    No cough                    2017         
       

 

 Gastrointestinal                    No abdominal pain                    2017                

 

 Gastrointestinal                    No constipation                    2017                

 

 Gastrointestinal                    No diarrhea                    2017 
               

 

 Musculoskeletal                    No stiffness                    2017 
               

 

 Musculoskeletal                    No arthralgia(s)                    2017                

 

 Neurologic                    No dyskinesia or tremor                    2017                

 

 Psychiatric                    No anxiety                    2017       
         

 

 Dermatologic                    rash                    2017            
    

 

 Dermatologic                    No scar                    2017         
       

 

 Genitourinary/Nephrology                    No urinary urgency                
    2017                

 

 Genitourinary/Nephrology                    No urinary incontinence           
         2017                

 

 Constitutional                    No night sweats                    2016                

 

 Constitutional                    No chills                    2016     
           

 

 Constitutional                    No fever                    2016      
          

 

 Eyes                    No vision change                    2016        
        

 

 Ears/Nose/Throat/Neck                    No dizziness                    2016                

 

 Ears/Nose/Throat/Neck                    No headache                    2016                

 

 Ears/Nose/Throat/Neck                    hearing loss                    2016                

 

 Cardiovascular                    No chest pain/pressure                                    

 

 Respiratory                    No cough                    2016         
       

 

 Gastrointestinal                    No abdominal pain                    2016                

 

 Gastrointestinal                    No constipation                    2016                

 

 Gastrointestinal                    No diarrhea                    2016 
               

 

 Musculoskeletal                    No stiffness                    2016 
               

 

 Musculoskeletal                    No arthralgia(s)                    2016                

 

 Dermatologic                    mole change                    2016     
           

 

 Dermatologic                    No rash                    2016         
       

 

 Dermatologic                    No scar                    2016         
       

 

 Neurologic                    No dyskinesia or tremor                    2016                

 

 Psychiatric                    No anxiety                    2016       
         

 

 Constitutional                    No night sweats                    2016                

 

 Constitutional                    No chills                    2016     
           

 

 Constitutional                    No fever                    2016      
          

 

 Eyes                    No vision change                    2016        
        

 

 Ears/Nose/Throat/Neck                    No dizziness                    2016                

 

 Ears/Nose/Throat/Neck                    No headache                    2016                

 

 Ears/Nose/Throat/Neck                    hearing loss                    2016                

 

 Cardiovascular                    No chest pain/pressure                                    

 

 Respiratory                    cough                    2016            
    

 

 Gastrointestinal                    No abdominal pain                    2016                

 

 Gastrointestinal                    No constipation                    2016                

 

 Gastrointestinal                    No diarrhea                    2016 
               

 

 Musculoskeletal                    No stiffness                    2016 
               

 

 Musculoskeletal                    No arthralgia(s)                    2016                

 

 Dermatologic                    mole change                    2016     
           

 

 Dermatologic                    No rash                    2016         
       

 

 Dermatologic                    No scar                    2016         
       

 

 Neurologic                    No dyskinesia or tremor                    2016                

 

 Psychiatric                    No anxiety                    2016       
         

 

 Ears/Nose/Throat/Neck                    sore throat                    2016                

 

 Constitutional                    No recent illness                    2015                

 

 Constitutional                    No chills                    2015     
           

 

 Constitutional                    No fatigue                    2015    
            

 

 Constitutional                    No fever                    2015      
          

 

 Constitutional                    No insomnia                    2015   
             

 

 Constitutional                    No malaise                    2015    
            

 

 Psychiatric                    No anxiety                    2015       
         

 

 Psychiatric                    No depression                    2015    
            

 

 Dermatologic                    mole change                    2015     
           

 

 Constitutional                    No night sweats                    2015                

 

 Constitutional                    No chills                    2015     
           

 

 Constitutional                    No fever                    2015      
          

 

 Eyes                    No vision change                    2015        
        

 

 Ears/Nose/Throat/Neck                    No dizziness                    2015                

 

 Ears/Nose/Throat/Neck                    No headache                    2015                

 

 Ears/Nose/Throat/Neck                    hearing loss                    2015                

 

 Cardiovascular                    No chest pain/pressure                    2015                

 

 Respiratory                    No cough                    2015         
       

 

 Gastrointestinal                    No abdominal pain                    2015                

 

 Gastrointestinal                    No constipation                    2015                

 

 Gastrointestinal                    No diarrhea                    2015 
               

 

 Musculoskeletal                    No stiffness                    2015 
               

 

 Musculoskeletal                    No arthralgia(s)                    2015                

 

 Neurologic                    No dyskinesia or tremor                    2015                

 

 Psychiatric                    No anxiety                    2015       
         

 

 Dermatologic                    No rash                    2015         
       

 

 Dermatologic                    No scar                    2015         
       

 

 Dermatologic                    mole change                    2015     
           

 

 Constitutional                    No night sweats                    2014                

 

 Constitutional                    No chills                    2014     
           

 

 Constitutional                    No fever                    2014      
          

 

 Eyes                    No vision change                    2014        
        

 

 Ears/Nose/Throat/Neck                    No dizziness                    2014                

 

 Ears/Nose/Throat/Neck                    No headache                    2014                

 

 Ears/Nose/Throat/Neck                    hearing loss                    2014                

 

 Cardiovascular                    No chest pain/pressure                    2014                

 

 Respiratory                    No cough                    2014         
       

 

 Gastrointestinal                    No abdominal pain                    2014                

 

 Gastrointestinal                    No constipation                    2014                

 

 Gastrointestinal                    No diarrhea                    2014 
               

 

 Musculoskeletal                    No stiffness                    2014 
               

 

 Musculoskeletal                    No arthralgia(s)                    2014                

 

 Neurologic                    No dyskinesia or tremor                    2014                

 

 Psychiatric                    No anxiety                    2014       
         

 

 Constitutional                    No night sweats                    2013                

 

 Constitutional                    No chills                    2013     
           

 

 Constitutional                    No fever                    2013      
          

 

 Cardiovascular                    No chest pain/pressure                                    

 

 Respiratory                    No cough                    2013         
       

 

 Neurologic                    No dyskinesia or tremor                    2013                

 

 Psychiatric                    No anxiety                    2013       
         

 

 Constitutional                    No night sweats                    2013                

 

 Constitutional                    No chills                    2013     
           

 

 Constitutional                    No fever                    2013      
          

 

 Cardiovascular                    No chest pain/pressure                                    

 

 Respiratory                    No cough                    2013         
       

 

 Gastrointestinal                    No abdominal pain                    2013                

 

 Gastrointestinal                    No constipation                    2013                

 

 Gastrointestinal                    No diarrhea                    2013 
               

 

 Musculoskeletal                    No stiffness                    2013 
               

 

 Musculoskeletal                    No arthralgia(s)                    2013                

 

 Neurologic                    No dyskinesia or tremor                    2013                

 

 Psychiatric                    No anxiety                    2013       
         

 

 Eyes                    No vision change                    2013        
        

 

 Ears/Nose/Throat/Neck                    hearing loss                    2013                

 

 Ears/Nose/Throat/Neck                    No headache                    2013                

 

 Ears/Nose/Throat/Neck                    No dizziness                    2013                

 

 Constitutional                    recent illness                    2012
                

 

 Constitutional                    No anorexia                    2012   
             

 

 Constitutional                    No night sweats                    2012                

 

 Constitutional                    No chills                    2012     
           

 

 Constitutional                    No diaphoresis                    2012
                

 

 Constitutional                    No fatigue                    2012    
            

 

 Constitutional                    No fever                    2012      
          

 

 Constitutional                    No insomnia                    2012   
             

 

 Eyes                    No eye discharge                    2012        
        

 

 Eyes                    No eye erythema                    2012         
       

 

 Ears/Nose/Throat/Neck                    No dizziness                    2012                

 

 Ears/Nose/Throat/Neck                    No headache                    2012                

 

 Ears/Nose/Throat/Neck                    nasal discharge                    2012                

 

 Ears/Nose/Throat/Neck                    No otalgia                    2012                

 

 Ears/Nose/Throat/Neck                    No sore throat                    2012                

 

 Gastrointestinal                    No nausea                    2012   
             

 

 Gastrointestinal                    No abdominal pain                    2012                

 

 Gastrointestinal                    No constipation                    2012                

 

 Gastrointestinal                    No diarrhea                    2012 
               

 

 Gastrointestinal                    No vomiting                    2012 
               

 

 Genitourinary/Nephrology                    No dysuria                                    

 

 Cardiovascular                    No chest pain/pressure                    2012                

 

 Cardiovascular                    No dyspnea                    2012    
            

 

 Constitutional                    No night sweats                    2012                

 

 Constitutional                    No chills                    2012     
           

 

 Constitutional                    No fever                    2012      
          

 

 Cardiovascular                    No chest pain/pressure                    2012                

 

 Respiratory                    No cough                    2012         
       

 

 Neurologic                    No dyskinesia or tremor                    2012                

 

 Psychiatric                    No anxiety                    2012       
         

 

 Gastrointestinal                    No abdominal pain                    2012                

 

 Gastrointestinal                    No constipation                    2012                

 

 Gastrointestinal                    No diarrhea                    2012 
               

 

 Musculoskeletal                    No stiffness                    2012 
               

 

 Musculoskeletal                    No arthralgia(s)                    2012                

 

 Constitutional                    No night sweats                    2012                

 

 Constitutional                    No chills                    2012     
           

 

 Constitutional                    No fever                    2012      
          

 

 Cardiovascular                    No chest pain/pressure                                    

 

 Respiratory                    No cough                    2012         
       

 

 Neurologic                    No dyskinesia or tremor                    2012                

 

 Psychiatric                    No anxiety                    2012       
         

 

 Constitutional                    No fatigue                    2012    
            

 

 Constitutional                    No fever                    2012      
          

 

 Ears/Nose/Throat/Neck                    hearing loss                    2012                

 

 Genitourinary/Nephrology                    No urinary urgency                
    2012                

 

 Genitourinary/Nephrology                    urinary retention/hesitancy       
             2012                

 

 Genitourinary/Nephrology                    No urinary frequency              
      2012                

 

 Genitourinary/Nephrology                    No urinary incontinence           
         2012                

 

 Genitourinary/Nephrology                    No dysuria                                    

 

 Genitourinary/Nephrology                    No hematuria                                    

 

 Eyes                    No vision change                    2012        
        

 

 Cardiovascular                    No chest pain/pressure                                    

 

 Cardiovascular                    dyspnea                    2012       
         

 

 Cardiovascular                    No edema                    2012      
          

 

 Cardiovascular                    exercise intolerance                                    

 

 Cardiovascular                    fatigue                    2012       
         

 

 Gastrointestinal                    No abdominal pain                    2012                

 

 Gastrointestinal                    No constipation                    2012                

 

 Gastrointestinal                    No diarrhea                    2012 
               

 

 Dermatologic                    rash                    2012            
    

 

 Dermatologic                    sores                    2012           
     

 

 Neurologic                    No ataxia                    2012         
       

 

 Neurologic                    No dizziness                    2012      
          

 

 Neurologic                    hearing loss                    2012      
          

 

 Neurologic                    No memory loss                    2012    
            

 

 Neurologic                    pain, back                    2012        
        

 

 Psychiatric                    No anxiety                    2012       
         

 

 Psychiatric                    No depression                    2012    
            

 

 Endocrine                    No cold sensitivity                    2012
                

 

 Endocrine                    No dry or coarse skin                    2012                

 

 Hematologic/Lymphatic                    No abnormal bleeding and bruising    
                2012                



                                                                    



Physical Exam

                      





 Exam Name                    System Name                    Item Name         
           Status                    Result                    Effective Dates 
                   Notes                

 

 Full Exam - General                     Constitutional                     
general appearance                    Overall:                    well 
developed                    2018                    None                

 

 Full Exam - General                     Constitutional                     
general appearance                    Overall:                    in no acute 
distress                    2018                    None                

 

 Full Exam - General                     Constitutional                     
general appearance                    Overall:                    well 
nourished                    2018                    None                

 

 Full Exam - General                     Eyes                    conjunctiva
/eyelids                    Overall:                    conjunctiva clear      
              2018                    None                

 

 Full Exam - General                     Eyes                    conjunctiva
/eyelids                    Overall:                    eyelids normal         
           2018                    None                

 

 Full Exam - General                     Eyes                    pupils and 
irises                    Overall:                    pupils equal, round, 
reactive to light and accomodation                    2018               
     None                

 

 Full Exam - General                     Ears/Nose/Throat                  
  external ear                    Auricle:                    a normal exam    
                2018                    None                

 

 Full Exam - General                     Ears/Nose/Throat                  
  external ear                    Auricle:                    lesion           
         2018                    dry white patching to the outer left 
auricle, nontender                

 

 Full Exam - General                     Ears/Nose/Throat                  
  external ear                    Auricle:                    nontender        
            2018                    None                

 

 Full Exam - General                     Ears/Nose/Throat                  
  external nose                    Overall:                    benign 
appearance                    2018                    None                

 

 Full Exam - General                     Ears/Nose/Throat                  
  hearing assessment                    Gross exam:                    
diminished to whisper                    2018                    None    
            

 

 Full Exam - General                     Ears/Nose/Throat                  
  lips/teeth/gingiva                    Overall:                    benign lips
                    2018                    None                

 

 Full Exam - General                     Ears/Nose/Throat                  
  lips/teeth/gingiva                    Overall:                    normal 
dentition                    2018                    None                

 

 Full Exam - General                     Ears/Nose/Throat                  
  lips/teeth/gingiva                    Overall:                    no masses  
                  2018                    pt with a very small posterior 
airway opening - long upper palate and large tongue base                

 

 Full Exam - General                     Ears/Nose/Throat                  
  oral cavity/pharynx/larynx                     Overall:                    
oral mucosa clear                    2018                    None        
        

 

 Full Exam - General                     Ears/Nose/Throat                  
  oral cavity/pharynx/larynx                     Overall:                    
mobile tongue benign                    2018                    None     
           

 

 Full Exam - General                     Ears/Nose/Throat                  
  oral cavity/pharynx/larynx                     Overall:                    
hard palate benign                    2018                    None       
         

 

 Full Exam - General                     Ears/Nose/Throat                  
  oral cavity/pharynx/larynx                     Overall:                    
oropharyngeal mucosa clear                    2018                    
None                

 

 Full Exam - General                     Ears/Nose/Throat                  
  oral cavity/pharynx/larynx                     Overall:                    no 
masses                    2018                    None                

 

 Full Exam - General                     Ears/Nose/Throat                  
  oral cavity/pharynx/larynx                     Mobile tongue:                
    a normal exam                    2018                    None        
        

 

 Full Exam - General                     Respiratory                    
auscultation                    Overall:                    breath sounds clear 
bilaterally                    2018                    None              
  

 

 Full Exam - General                     Respiratory                    
respiratory effort/rhythm                    Overall:                    no 
retractions                    2018                    None              
  

 

 Full Exam - General                     Respiratory                    
respiratory effort/rhythm                    Overall:                    normal 
rate                    2018                    None                

 

 Full Exam - General                     Cardiovascular                    
extremities                    Overall:                    no clubbing         
           2018                    None                

 

 Full Exam - General                     Cardiovascular                    
auscultation of heart                    Rate:                    regular rate 
                   2018                    None                

 

 Full Exam - General                     Cardiovascular                    
auscultation of heart                    Rhythm:                    regular 
rhythm                    2018                    None                

 

 Full Exam - General                     Cardiovascular                    
auscultation of heart                    Murmur:                    no murmur  
                  2018                    None                

 

 Full Exam - General                     Abdomen                    
abdominal exam                    Overall:                    no tenderness    
                2018                    None                

 

 Full Exam - General                     Abdomen                    
abdominal exam                    Overall:                    normal bowel 
sounds                    2018                    None                

 

 Full Exam - General                     Lymphatic                    neck 
nodes                    Overall:                    anterior cervical chain 
benign                    2018                    None                

 

 Full Exam - General                     Lymphatic                    neck 
nodes                    Overall:                    posterior cervical chain 
benign                    2018                    None                

 

 Full Exam - General                     Musculoskeletal                    
head and neck                    Overall:                    head atraumatic   
                 2018                    None                

 

 Full Exam - General                     Musculoskeletal                    
head and neck                    Overall:                    cervical spine 
benign                    2018                    None                

 

 Full Exam - General                     Integument                    
inspection of skin                    Location:                    left arm    
                2018                    None                

 

 Full Exam - General                     Integument                    
inspection of skin                    Location:                    right arm   
                 2018                    None                

 

 Full Exam - General                     Integument                    
inspection of skin                    Location:                    left leg    
                2018                    None                

 

 Full Exam - General                     Integument                    
inspection of skin                    Location:                    right leg   
                 2018                    None                

 

 Full Exam - General                     Neurologic                    gait
                    Overall:                    no ataxia, no unsteadiness     
               2018                    None                

 

 Full Exam - General                     Neurologic                    
cranial nerves                    Overall:                    crainial nerves 2 
- 12 grossly intact                    2018                    None      
          

 

 Full Exam - General                     Psychiatric                    
orientation/consciousness                    Overall:                    
oriented to person, place and time                    2018               
     None                

 

 Full Exam - General                     Psychiatric                    
mood and affect                    Overall:                    normal mood and 
affect                    2018                    None                

 

 Full Exam - General                     Psychiatric                    
appearance                    Overall:                    well-groomed, good 
eye contact                    2018                    None              
  

 

 Full Exam - General                     Psychiatric                    
judgment/insight                    Overall:                    judgment and 
insight intact                    2018                    None           
     

 

 Full Exam - General                     Constitutional                     
general appearance                    Overall:                    well 
developed                    2017                    None                

 

 Full Exam - General                     Constitutional                     
general appearance                    Overall:                    in no acute 
distress                    2017                    None                

 

 Full Exam - General                     Constitutional                     
general appearance                    Overall:                    well 
nourished                    2017                    None                

 

 Full Exam - General                     Eyes                    conjunctiva
/eyelids                    Overall:                    conjunctiva clear      
              2017                    None                

 

 Full Exam - General                     Eyes                    conjunctiva
/eyelids                    Overall:                    eyelids normal         
           2017                    None                

 

 Full Exam - General                     Eyes                    pupils and 
irises                    Overall:                    pupils equal, round, 
reactive to light and accomodation                    2017               
     None                

 

 Full Exam - General                     Ears/Nose/Throat                  
  external ear                    Auricle:                    a normal exam    
                2017                    None                

 

 Full Exam - General                     Ears/Nose/Throat                  
  external ear                    Auricle:                    lesion           
         2017                    dry white patching to the outer left 
auricle, nontender                

 

 Full Exam - General                     Ears/Nose/Throat                  
  external ear                    Auricle:                    nontender        
            2017                    None                

 

 Full Exam - General                     Ears/Nose/Throat                  
  external nose                    Overall:                    benign 
appearance                    2017                    None                

 

 Full Exam - General                     Ears/Nose/Throat                  
  hearing assessment                    Gross exam:                    
diminished to whisper                    2017                    None    
            

 

 Full Exam - General                     Ears/Nose/Throat                  
  lips/teeth/gingiva                    Overall:                    benign lips
                    2017                    None                

 

 Full Exam - General                     Ears/Nose/Throat                  
  lips/teeth/gingiva                    Overall:                    normal 
dentition                    2017                    None                

 

 Full Exam - General                     Ears/Nose/Throat                  
  lips/teeth/gingiva                    Overall:                    no masses  
                  2017                    pt with a very small posterior 
airway opening - long upper palate and large tongue base                

 

 Full Exam - General 1995                    Ears/Nose/Throat                  
  oral cavity/pharynx/larynx                     Overall:                    
oral mucosa clear                    2017                    None        
        

 

 Full Exam - General 1995                    Ears/Nose/Throat                  
  oral cavity/pharynx/larynx                     Overall:                    
mobile tongue benign                    2017                    None     
           

 

 Full Exam - General                     Ears/Nose/Throat                  
  oral cavity/pharynx/larynx                     Overall:                    
hard palate benign                    2017                    None       
         

 

 Full Exam - General                     Ears/Nose/Throat                  
  oral cavity/pharynx/larynx                     Overall:                    
oropharyngeal mucosa clear                    2017                    
None                

 

 Full Exam - General                     Ears/Nose/Throat                  
  oral cavity/pharynx/larynx                     Overall:                    no 
masses                    2017                    None                

 

 Full Exam - General                     Ears/Nose/Throat                  
  oral cavity/pharynx/larynx                     Mobile tongue:                
    a normal exam                    2017                    None        
        

 

 Full Exam - General                     Respiratory                    
auscultation                    Overall:                    breath sounds clear 
bilaterally                    2017                    None              
  

 

 Full Exam - General                     Respiratory                    
respiratory effort/rhythm                    Overall:                    no 
retractions                    2017                    None              
  

 

 Full Exam - General                     Respiratory                    
respiratory effort/rhythm                    Overall:                    normal 
rate                    2017                    None                

 

 Full Exam - General                     Cardiovascular                    
extremities                    Overall:                    no clubbing         
           2017                    None                

 

 Full Exam - General                     Cardiovascular                    
auscultation of heart                    Rate:                    regular rate 
                   2017                    None                

 

 Full Exam - General                     Cardiovascular                    
auscultation of heart                    Rhythm:                    regular 
rhythm                    2017                    None                

 

 Full Exam - General                     Cardiovascular                    
auscultation of heart                    Murmur:                    no murmur  
                  2017                    None                

 

 Full Exam - General                     Abdomen                    
abdominal exam                    Overall:                    no tenderness    
                2017                    None                

 

 Full Exam - General                     Abdomen                    
abdominal exam                    Overall:                    normal bowel 
sounds                    2017                    None                

 

 Full Exam - General                     Musculoskeletal                    
head and neck                    Overall:                    head atraumatic   
                 2017                    None                

 

 Full Exam - General                     Musculoskeletal                    
head and neck                    Overall:                    cervical spine 
benign                    2017                    None                

 

 Full Exam - General                     Neurologic                    gait
                    Overall:                    no ataxia, no unsteadiness     
               2017                    None                

 

 Full Exam - General                     Neurologic                    
cranial nerves                    Overall:                    crainial nerves 2 
- 12 grossly intact                    2017                    None      
          

 

 Full Exam - General                     Psychiatric                    
orientation/consciousness                    Overall:                    
oriented to person, place and time                    2017               
     None                

 

 Full Exam - General                     Psychiatric                    
mood and affect                    Overall:                    normal mood and 
affect                    2017                    None                

 

 Full Exam - General                     Psychiatric                    
appearance                    Overall:                    well-groomed, good 
eye contact                    2017                    None              
  

 

 Full Exam - General                     Psychiatric                    
judgment/insight                    Overall:                    judgment and 
insight intact                    2017                    None           
     

 

 Full Exam - General                     Lymphatic                    neck 
nodes                    Overall:                    anterior cervical chain 
benign                    2017                    None                

 

 Full Exam - General                     Lymphatic                    neck 
nodes                    Overall:                    posterior cervical chain 
benign                    2017                    None                

 

 Full Exam - General                     Integument                    
inspection of skin                    Location:                    right arm   
                 2017                    None                

 

 Full Exam - General                     Integument                    
inspection of skin                    Location:                    left arm    
                2017                    None                

 

 Full Exam - General                     Integument                    
inspection of skin                    Location:                    left leg    
                2017                    None                

 

 Full Exam - General                     Integument                    
inspection of skin                    Location:                    right leg   
                 2017                    None                

 

 Full Exam - General                     Constitutional                     
general appearance                    Overall:                    well 
developed                    2017                    None                

 

 Full Exam - General                     Constitutional                     
general appearance                    Overall:                    in no acute 
distress                    2017                    None                

 

 Full Exam - General                     Constitutional                     
general appearance                    Overall:                    well 
nourished                    2017                    None                

 

 Full Exam - General                     Eyes                    conjunctiva
/eyelids                    Overall:                    conjunctiva clear      
              2017                    None                

 

 Full Exam - General                     Eyes                    conjunctiva
/eyelids                    Overall:                    eyelids normal         
           2017                    None                

 

 Full Exam - General                     Eyes                    pupils and 
irises                    Overall:                    pupils equal, round, 
reactive to light and accomodation                    2017               
     None                

 

 Full Exam - General                     Ears/Nose/Throat                  
  external ear                    Auricle:                    a normal exam    
                2017                    None                

 

 Full Exam - General                     Ears/Nose/Throat                  
  external ear                    Auricle:                    lesion           
         2017                    dry white patching to the outer left 
auricle, nontender                

 

 Full Exam - General                     Ears/Nose/Throat                  
  external ear                    Auricle:                    nontender        
            2017                    None                

 

 Full Exam - General                     Ears/Nose/Throat                  
  external nose                    Overall:                    benign 
appearance                    2017                    None                

 

 Full Exam - General                     Ears/Nose/Throat                  
  hearing assessment                    Gross exam:                    
diminished to whisper                    2017                    None    
            

 

 Full Exam - General                     Ears/Nose/Throat                  
  lips/teeth/gingiva                    Overall:                    benign lips
                    2017                    None                

 

 Full Exam - General                     Ears/Nose/Throat                  
  lips/teeth/gingiva                    Overall:                    normal 
dentition                    2017                    None                

 

 Full Exam - General                     Ears/Nose/Throat                  
  lips/teeth/gingiva                    Overall:                    no masses  
                  2017                    pt with a very small posterior 
airway opening - long upper palate and large tongue base                

 

 Full Exam - General                     Ears/Nose/Throat                  
  oral cavity/pharynx/larynx                     Overall:                    
oral mucosa clear                    2017                    None        
        

 

 Full Exam - General                     Ears/Nose/Throat                  
  oral cavity/pharynx/larynx                     Overall:                    
mobile tongue benign                    2017                    None     
           

 

 Full Exam - General                     Ears/Nose/Throat                  
  oral cavity/pharynx/larynx                     Overall:                    
hard palate benign                    2017                    None       
         

 

 Full Exam - General                     Ears/Nose/Throat                  
  oral cavity/pharynx/larynx                     Overall:                    
oropharyngeal mucosa clear                    2017                    
None                

 

 Full Exam - General                     Ears/Nose/Throat                  
  oral cavity/pharynx/larynx                     Overall:                    no 
masses                    2017                    None                

 

 Full Exam - General                     Ears/Nose/Throat                  
  oral cavity/pharynx/larynx                     Mobile tongue:                
    a normal exam                    2017                    None        
        

 

 Full Exam - General                     Neck                    inspection 
of neck                    Overall:                    no carotid bruits       
             2017                    None                

 

 Full Exam - General                     Respiratory                    
auscultation                    Overall:                    breath sounds clear 
bilaterally                    2017                    None              
  

 

 Full Exam - General                     Respiratory                    
respiratory effort/rhythm                    Overall:                    no 
retractions                    2017                    None              
  

 

 Full Exam - General                     Respiratory                    
respiratory effort/rhythm                    Overall:                    normal 
rate                    2017                    None                

 

 Full Exam - General                     Cardiovascular                    
extremities                    Overall:                    no clubbing         
           2017                    None                

 

 Full Exam - General                     Cardiovascular                    
auscultation of heart                    Rate:                    regular rate 
                   2017                    None                

 

 Full Exam - General                     Cardiovascular                    
auscultation of heart                    Rhythm:                    regular 
rhythm                    2017                    None                

 

 Full Exam - General                     Cardiovascular                    
auscultation of heart                    Murmur:                    no murmur  
                  2017                    None                

 

 Full Exam - General                     Musculoskeletal                    
head and neck                    Overall:                    head atraumatic   
                 2017                    None                

 

 Full Exam - General                     Musculoskeletal                    
head and neck                    Overall:                    cervical spine 
benign                    2017                    None                

 

 Full Exam - General                     Neurologic                    gait
                    Overall:                    no ataxia, no unsteadiness     
               2017                    None                

 

 Full Exam - General                     Neurologic                    
cranial nerves                    Overall:                    crainial nerves 2 
- 12 grossly intact                    2017                    None      
          

 

 Full Exam - General                     Psychiatric                    
orientation/consciousness                    Overall:                    
oriented to person, place and time                    2017               
     None                

 

 Full Exam - General                     Psychiatric                    
mood and affect                    Overall:                    normal mood and 
affect                    2017                    None                

 

 Full Exam - General                     Psychiatric                    
appearance                    Overall:                    well-groomed, good 
eye contact                    2017                    None              
  

 

 Full Exam - General                     Psychiatric                    
judgment/insight                    Overall:                    judgment and 
insight intact                    2017                    None           
     

 

 Full Exam - General                     Abdomen                    
abdominal exam                    Overall:                    no tenderness    
                2017                    None                

 

 Full Exam - General                     Abdomen                    
abdominal exam                    Overall:                    normal bowel 
sounds                    2017                    None                

 

 Full Exam - General                     Lymphatic                    neck 
nodes                    Overall:                    anterior cervical chain 
benign                    2017                    None                

 

 Full Exam - General                     Lymphatic                    neck 
nodes                    Overall:                    posterior cervical chain 
benign                    2017                    None                

 

 Full Exam - General                     Integument                    
inspection of skin                    Location:                    right leg   
                 2017                    keratotic lesion right leg      
          

 

 Full Exam - General                     Constitutional                     
general appearance                    Overall:                    well 
developed                    2016                    None                

 

 Full Exam - General                     Constitutional                     
general appearance                    Overall:                    in no acute 
distress                    2016                    None                

 

 Full Exam - General                     Constitutional                     
general appearance                    Overall:                    well 
nourished                    2016                    None                

 

 Full Exam - General                     Eyes                    conjunctiva
/eyelids                    Overall:                    conjunctiva clear      
              2016                    None                

 

 Full Exam - General                     Eyes                    conjunctiva
/eyelids                    Overall:                    eyelids normal         
           2016                    None                

 

 Full Exam - General                     Eyes                    pupils and 
irises                    Overall:                    pupils equal, round, 
reactive to light and accomodation                    2016               
     None                

 

 Full Exam - General                     Ears/Nose/Throat                  
  external ear                    Auricle:                    a normal exam    
                2016                    None                

 

 Full Exam - General                     Ears/Nose/Throat                  
  external ear                    Auricle:                    lesion           
         2016                    dry white patching to the outer left 
auricle, nontender                

 

 Full Exam - General                     Ears/Nose/Throat                  
  external ear                    Auricle:                    nontender        
            2016                    None                

 

 Full Exam - General                     Ears/Nose/Throat                  
  external nose                    Overall:                    benign 
appearance                    2016                    None                

 

 Full Exam - General                     Ears/Nose/Throat                  
  hearing assessment                    Gross exam:                    
diminished to whisper                    2016                    None    
            

 

 Full Exam - General                     Ears/Nose/Throat                  
  lips/teeth/gingiva                    Overall:                    benign lips
                    2016                    None                

 

 Full Exam - General                     Ears/Nose/Throat                  
  lips/teeth/gingiva                    Overall:                    normal 
dentition                    2016                    None                

 

 Full Exam - General                     Ears/Nose/Throat                  
  lips/teeth/gingiva                    Overall:                    no masses  
                  2016                    pt with a very small posterior 
airway opening - long upper palate and large tongue base                

 

 Full Exam - General                     Ears/Nose/Throat                  
  oral cavity/pharynx/larynx                     Overall:                    
oral mucosa clear                    2016                    None        
        

 

 Full Exam - General                     Ears/Nose/Throat                  
  oral cavity/pharynx/larynx                     Overall:                    
mobile tongue benign                    2016                    None     
           

 

 Full Exam - General                     Ears/Nose/Throat                  
  oral cavity/pharynx/larynx                     Overall:                    
hard palate benign                    2016                    None       
         

 

 Full Exam - General                     Ears/Nose/Throat                  
  oral cavity/pharynx/larynx                     Overall:                    
oropharyngeal mucosa clear                    2016                    
None                

 

 Full Exam - General                     Ears/Nose/Throat                  
  oral cavity/pharynx/larynx                     Overall:                    no 
masses                    2016                    None                

 

 Full Exam - General                     Ears/Nose/Throat                  
  oral cavity/pharynx/larynx                     Mobile tongue:                
    a normal exam                    2016                    None        
        

 

 Full Exam - General                     Neck                    inspection 
of neck                    Overall:                    no carotid bruits       
             2016                    None                

 

 Full Exam - General                     Respiratory                    
auscultation                    Overall:                    breath sounds clear 
bilaterally                    2016                    None              
  

 

 Full Exam - General                     Respiratory                    
respiratory effort/rhythm                    Overall:                    no 
retractions                    2016                    None              
  

 

 Full Exam - General                     Respiratory                    
respiratory effort/rhythm                    Overall:                    normal 
rate                    2016                    None                

 

 Full Exam - General                     Cardiovascular                    
extremities                    Overall:                    no clubbing         
           2016                    None                

 

 Full Exam - General                     Cardiovascular                    
auscultation of heart                    Rate:                    regular rate 
                   2016                    None                

 

 Full Exam - General                     Cardiovascular                    
auscultation of heart                    Rhythm:                    regular 
rhythm                    2016                    None                

 

 Full Exam - General                     Cardiovascular                    
auscultation of heart                    Murmur:                    no murmur  
                  2016                    None                

 

 Full Exam - General                     Abdomen                    
abdominal exam                    Overall:                    no tenderness    
                2016                    None                

 

 Full Exam - General                     Abdomen                    
abdominal exam                    Overall:                    normal bowel 
sounds                    2016                    None                

 

 Full Exam - General                     Musculoskeletal                    
head and neck                    Overall:                    head atraumatic   
                 2016                    None                

 

 Full Exam - General                     Musculoskeletal                    
head and neck                    Overall:                    cervical spine 
benign                    2016                    None                

 

 Full Exam - General                     Integument                    
inspection of skin                    Dermatitis:                    mole      
              2016                    dark lesion on right neck behind 
right ear                

 

 Full Exam - General                     Neurologic                    gait
                    Overall:                    no ataxia, no unsteadiness     
               2016                    None                

 

 Full Exam - General                     Neurologic                    
cranial nerves                    Overall:                    crainial nerves 2 
- 12 grossly intact                    2016                    None      
          

 

 Full Exam - General                     Psychiatric                    
orientation/consciousness                    Overall:                    
oriented to person, place and time                    2016               
     None                

 

 Full Exam - General                     Psychiatric                    
mood and affect                    Overall:                    normal mood and 
affect                    2016                    None                

 

 Full Exam - General                     Psychiatric                    
appearance                    Overall:                    well-groomed, good 
eye contact                    2016                    None              
  

 

 Full Exam - General                     Psychiatric                    
judgment/insight                    Overall:                    judgment and 
insight intact                    2016                    None           
     

 

 Full Exam - General                     Ears/Nose/Throat                  
  otoscopic exam                    External auditory canal:                    
nontender                    2016                    but pt has hearing 
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 
cushion left in right ear, unable to obtain with alligator forceps             
   

 

 Full Exam - General                     Constitutional                     
general appearance                    Overall:                    well 
developed                    2016                    None                

 

 Full Exam - General                     Constitutional                     
general appearance                    Overall:                    in no acute 
distress                    2016                    None                

 

 Full Exam - General                     Constitutional                     
general appearance                    Overall:                    well 
nourished                    2016                    None                

 

 Full Exam - General                     Eyes                    conjunctiva
/eyelids                    Overall:                    conjunctiva clear      
              2016                    None                

 

 Full Exam - General                     Eyes                    conjunctiva
/eyelids                    Overall:                    eyelids normal         
           2016                    None                

 

 Full Exam - General                     Eyes                    pupils and 
irises                    Overall:                    pupils equal, round, 
reactive to light and accomodation                    2016               
     None                

 

 Full Exam - General                     Ears/Nose/Throat                  
  external ear                    Auricle:                    a normal exam    
                2016                    None                

 

 Full Exam - General                     Ears/Nose/Throat                  
  external ear                    Auricle:                    lesion           
         2016                    dry white patching to the outer left 
auricle, nontender                

 

 Full Exam - General                     Ears/Nose/Throat                  
  external ear                    Auricle:                    nontender        
            2016                    None                

 

 Full Exam - General                     Ears/Nose/Throat                  
  external nose                    Overall:                    benign 
appearance                    2016                    None                

 

 Full Exam - General                     Ears/Nose/Throat                  
  hearing assessment                    Gross exam:                    
diminished to whisper                    2016                    None    
            

 

 Full Exam - General                     Ears/Nose/Throat                  
  lips/teeth/gingiva                    Overall:                    benign lips
                    2016                    None                

 

 Full Exam - General                     Ears/Nose/Throat                  
  lips/teeth/gingiva                    Overall:                    normal 
dentition                    2016                    None                

 

 Full Exam - General                     Ears/Nose/Throat                  
  lips/teeth/gingiva                    Overall:                    no masses  
                  2016                    pt with a very small posterior 
airway opening - long upper palate and large tongue base                

 

 Full Exam - General                     Ears/Nose/Throat                  
  oral cavity/pharynx/larynx                     Overall:                    
oral mucosa clear                    2016                    None        
        

 

 Full Exam - General                     Ears/Nose/Throat                  
  oral cavity/pharynx/larynx                     Overall:                    
mobile tongue benign                    2016                    None     
           

 

 Full Exam - General                     Ears/Nose/Throat                  
  oral cavity/pharynx/larynx                     Overall:                    
hard palate benign                    2016                    None       
         

 

 Full Exam - General                     Ears/Nose/Throat                  
  oral cavity/pharynx/larynx                     Overall:                    
oropharyngeal mucosa clear                    2016                    
None                

 

 Full Exam - General                     Ears/Nose/Throat                  
  oral cavity/pharynx/larynx                     Overall:                    no 
masses                    2016                    None                

 

 Full Exam - General                     Ears/Nose/Throat                  
  oral cavity/pharynx/larynx                     Mobile tongue:                
    a normal exam                    2016                    None        
        

 

 Full Exam - General                     Respiratory                    
auscultation                    Overall:                    breath sounds clear 
bilaterally                    2016                    None              
  

 

 Full Exam - General                     Respiratory                    
respiratory effort/rhythm                    Overall:                    no 
retractions                    2016                    None              
  

 

 Full Exam - General                     Respiratory                    
respiratory effort/rhythm                    Overall:                    normal 
rate                    2016                    None                

 

 Full Exam - General                     Cardiovascular                    
extremities                    Overall:                    no clubbing         
           2016                    None                

 

 Full Exam - General                     Cardiovascular                    
auscultation of heart                    Rate:                    regular rate 
                   2016                    None                

 

 Full Exam - General                     Cardiovascular                    
auscultation of heart                    Rhythm:                    regular 
rhythm                    2016                    None                

 

 Full Exam - General                     Cardiovascular                    
auscultation of heart                    Murmur:                    no murmur  
                  2016                    None                

 

 Full Exam - General                     Abdomen                    
abdominal exam                    Overall:                    no tenderness    
                2016                    None                

 

 Full Exam - General                     Abdomen                    
abdominal exam                    Overall:                    normal bowel 
sounds                    2016                    None                

 

 Full Exam - General                     Musculoskeletal                    
head and neck                    Overall:                    head atraumatic   
                 2016                    None                

 

 Full Exam - General                     Musculoskeletal                    
head and neck                    Overall:                    cervical spine 
benign                    2016                    None                

 

 Full Exam - General                     Neurologic                    gait
                    Overall:                    no ataxia, no unsteadiness     
               2016                    None                

 

 Full Exam - General                     Neurologic                    
cranial nerves                    Overall:                    crainial nerves 2 
- 12 grossly intact                    2016                    None      
          

 

 Full Exam - General                     Psychiatric                    
orientation/consciousness                    Overall:                    
oriented to person, place and time                    2016               
     None                

 

 Full Exam - General                     Psychiatric                    
mood and affect                    Overall:                    normal mood and 
affect                    2016                    None                

 

 Full Exam - General                     Psychiatric                    
appearance                    Overall:                    well-groomed, good 
eye contact                    2016                    None              
  

 

 Full Exam - General                     Psychiatric                    
judgment/insight                    Overall:                    judgment and 
insight intact                    2016                    None           
     

 

 Full Exam - General                     Integument                    
inspection of skin                    Dermatitis:                    mole      
              2016                    right neck - dark brown changes to 
the neck below right jawline                

 

 Full Exam - General                     Lymphatic                    neck 
nodes                    Overall:                    anterior cervical chain 
benign                    2016                    None                

 

 Full Exam - General                     Lymphatic                    neck 
nodes                    Overall:                    posterior cervical chain 
benign                    2016                    None                

 

 Full Exam - General                     Constitutional                     
general appearance                    Overall:                    well 
nourished                    2015                    None                

 

 Full Exam - General                     Constitutional                     
general appearance                    Overall:                    well 
developed                    2015                    None                

 

 Full Exam - General                     Constitutional                     
general appearance                    Overall:                    in no acute 
distress                    2015                    None                

 

 Full Exam - General                     Psychiatric                    
orientation/consciousness                    Overall:                    
oriented to person, place and time                    2015               
     None                

 

 Full Exam - General                     Integument                    
inspection of skin                    Dermatitis:                    mole      
              2015                    on left upper lateral deltoid 
region - irritated with a red base, partially rolled border on lower portion 
from 5 oclock to 8 oclock with a white scar/skin change below the lesion -     
            

 

 Full Exam - General                     Integument                    
inspection of skin                    Location:                    left arm    
                2015                    Consent signed and on chart.  The 
patient's mole was cleansed with alcohol and infiltrated with lidocaine with 
epinephrine.  The lesion was cleansed with iodine, and draped in sterile 
fashion.  The  lesion was then removed with sharp excision using an elliptical 
excision. The surgical site was closed with 4-0 Vicryl with____7__ # sutures.  
Dressing placed on lesion prior to pt discharge.                

 

 Full Exam - General                     Constitutional                     
general appearance                    Overall:                    well 
developed                    2015                    None                

 

 Full Exam - General                     Constitutional                     
general appearance                    Overall:                    in no acute 
distress                    2015                    None                

 

 Full Exam - General                     Constitutional                     
general appearance                    Overall:                    well 
nourished                    2015                    None                

 

 Full Exam - General                     Eyes                    conjunctiva
/eyelids                    Overall:                    conjunctiva clear      
              2015                    None                

 

 Full Exam - General                     Eyes                    conjunctiva
/eyelids                    Overall:                    eyelids normal         
           2015                    None                

 

 Full Exam - General                     Eyes                    pupils and 
irises                    Overall:                    pupils equal, round, 
reactive to light and accomodation                    2015               
     None                

 

 Full Exam - General                     Ears/Nose/Throat                  
  external ear                    Auricle:                    a normal exam    
                2015                    None                

 

 Full Exam - General                     Ears/Nose/Throat                  
  external ear                    Auricle:                    lesion           
         2015                    dry white patching to the outer left 
auricle, nontender                

 

 Full Exam - General                     Ears/Nose/Throat                  
  external ear                    Auricle:                    nontender        
            2015                    None                

 

 Full Exam - General                     Ears/Nose/Throat                  
  external nose                    Overall:                    benign 
appearance                    2015                    None                

 

 Full Exam - General                     Ears/Nose/Throat                  
  hearing assessment                    Gross exam:                    
diminished to whisper                    2015                    None    
            

 

 Full Exam - General                     Ears/Nose/Throat                  
  lips/teeth/gingiva                    Overall:                    benign lips
                    2015                    None                

 

 Full Exam - General                     Ears/Nose/Throat                  
  lips/teeth/gingiva                    Overall:                    normal 
dentition                    2015                    None                

 

 Full Exam - General                     Ears/Nose/Throat                  
  lips/teeth/gingiva                    Overall:                    no masses  
                  2015                    pt with a very small posterior 
airway opening - long upper palate and large tongue base                

 

 Full Exam - General                     Ears/Nose/Throat                  
  oral cavity/pharynx/larynx                     Overall:                    
oral mucosa clear                    2015                    None        
        

 

 Full Exam - General                     Ears/Nose/Throat                  
  oral cavity/pharynx/larynx                     Overall:                    
mobile tongue benign                    2015                    None     
           

 

 Full Exam - General                     Ears/Nose/Throat                  
  oral cavity/pharynx/larynx                     Overall:                    
hard palate benign                    2015                    None       
         

 

 Full Exam - General                     Ears/Nose/Throat                  
  oral cavity/pharynx/larynx                     Overall:                    
oropharyngeal mucosa clear                    2015                    
None                

 

 Full Exam - General                     Ears/Nose/Throat                  
  oral cavity/pharynx/larynx                     Overall:                    no 
masses                    2015                    None                

 

 Full Exam - General                     Ears/Nose/Throat                  
  oral cavity/pharynx/larynx                     Mobile tongue:                
    a normal exam                    2015                    None        
        

 

 Full Exam - General                     Neck                    inspection 
of neck                    Overall:                    no carotid bruits       
             2015                    None                

 

 Full Exam - General                     Respiratory                    
auscultation                    Overall:                    breath sounds clear 
bilaterally                    2015                    None              
  

 

 Full Exam - General                     Respiratory                    
respiratory effort/rhythm                    Overall:                    no 
retractions                    2015                    None              
  

 

 Full Exam - General                     Respiratory                    
respiratory effort/rhythm                    Overall:                    normal 
rate                    2015                    None                

 

 Full Exam - General                     Cardiovascular                    
extremities                    Overall:                    no clubbing         
           2015                    None                

 

 Full Exam - General                     Cardiovascular                    
auscultation of heart                    Rate:                    regular rate 
                   2015                    None                

 

 Full Exam - General                     Cardiovascular                    
auscultation of heart                    Rhythm:                    regular 
rhythm                    2015                    None                

 

 Full Exam - General                     Cardiovascular                    
auscultation of heart                    Murmur:                    no murmur  
                  2015                    None                

 

 Full Exam - General                     Musculoskeletal                    
head and neck                    Overall:                    head atraumatic   
                 2015                    None                

 

 Full Exam - General                     Musculoskeletal                    
head and neck                    Overall:                    cervical spine 
benign                    2015                    None                

 

 Full Exam - General                     Integument                    
inspection of skin                    Dermatitis:                    mole      
              2015                    dark lesion on left upper arm      
          

 

 Full Exam - General                     Neurologic                    gait
                    Overall:                    no ataxia, no unsteadiness     
               2015                    None                

 

 Full Exam - General                     Neurologic                    
cranial nerves                    Overall:                    crainial nerves 2 
- 12 grossly intact                    2015                    None      
          

 

 Full Exam - General                     Psychiatric                    
orientation/consciousness                    Overall:                    
oriented to person, place and time                    2015               
     None                

 

 Full Exam - General                     Psychiatric                    
mood and affect                    Overall:                    normal mood and 
affect                    2015                    None                

 

 Full Exam - General                     Psychiatric                    
appearance                    Overall:                    well-groomed, good 
eye contact                    2015                    None              
  

 

 Full Exam - General                     Psychiatric                    
judgment/insight                    Overall:                    judgment and 
insight intact                    2015                    None           
     

 

 Full Exam - General                     Abdomen                    
abdominal exam                    Overall:                    no tenderness    
                2015                    None                

 

 Full Exam - General                     Abdomen                    
abdominal exam                    Overall:                    normal bowel 
sounds                    2015                    None                

 

 Full Exam - General                     Constitutional                     
general appearance                    Overall:                    well 
developed                    2014                    None                

 

 Full Exam - General                     Constitutional                     
general appearance                    Overall:                    in no acute 
distress                    2014                    None                

 

 Full Exam - General                     Constitutional                     
general appearance                    Overall:                    well 
nourished                    2014                    None                

 

 Full Exam - General                     Eyes                    conjunctiva
/eyelids                    Overall:                    conjunctiva clear      
              2014                    None                

 

 Full Exam - General                     Eyes                    conjunctiva
/eyelids                    Overall:                    eyelids normal         
           2014                    None                

 

 Full Exam - General                     Eyes                    pupils and 
irises                    Overall:                    pupils equal, round, 
reactive to light and accomodation                    2014               
     None                

 

 Full Exam - General                     Respiratory                    
auscultation                    Overall:                    breath sounds clear 
bilaterally                    2014                    None              
  

 

 Full Exam - General                     Respiratory                    
respiratory effort/rhythm                    Overall:                    no 
retractions                    2014                    None              
  

 

 Full Exam - General                     Respiratory                    
respiratory effort/rhythm                    Overall:                    normal 
rate                    2014                    None                

 

 Full Exam - General                     Cardiovascular                    
extremities                    Overall:                    no clubbing         
           2014                    None                

 

 Full Exam - General                     Cardiovascular                    
auscultation of heart                    Rate:                    regular rate 
                   2014                    None                

 

 Full Exam - General                     Cardiovascular                    
auscultation of heart                    Rhythm:                    regular 
rhythm                    2014                    None                

 

 Full Exam - General                     Cardiovascular                    
auscultation of heart                    Murmur:                    no murmur  
                  2014                    None                

 

 Full Exam - General                     Psychiatric                    
orientation/consciousness                    Overall:                    
oriented to person, place and time                    2014               
     None                

 

 Full Exam - General                     Psychiatric                    
mood and affect                    Overall:                    normal mood and 
affect                    2014                    None                

 

 Full Exam - General                     Psychiatric                    
appearance                    Overall:                    well-groomed, good 
eye contact                    2014                    None              
  

 

 Full Exam - General                     Psychiatric                    
judgment/insight                    Overall:                    judgment and 
insight intact                    2014                    None           
     

 

 Full Exam - General                     Ears/Nose/Throat                  
  external ear                    Auricle:                    a normal exam    
                2014                    None                

 

 Full Exam - General                     Ears/Nose/Throat                  
  external ear                    Auricle:                    nontender        
            2014                    None                

 

 Full Exam - General                     Ears/Nose/Throat                  
  external nose                    Overall:                    benign 
appearance                    2014                    None                

 

 Full Exam - General                     Ears/Nose/Throat                  
  lips/teeth/gingiva                    Overall:                    benign lips
                    2014                    None                

 

 Full Exam - General                     Ears/Nose/Throat                  
  lips/teeth/gingiva                    Overall:                    normal 
dentition                    2014                    None                

 

 Full Exam - General                     Ears/Nose/Throat                  
  oral cavity/pharynx/larynx                     Overall:                    
oral mucosa clear                    2014                    None        
        

 

 Full Exam - General                     Ears/Nose/Throat                  
  oral cavity/pharynx/larynx                     Overall:                    
mobile tongue benign                    2014                    None     
           

 

 Full Exam - General                     Ears/Nose/Throat                  
  oral cavity/pharynx/larynx                     Overall:                    
hard palate benign                    2014                    None       
         

 

 Full Exam - General 1995                    Ears/Nose/Throat                  
  oral cavity/pharynx/larynx                     Overall:                    
oropharyngeal mucosa clear                    2014                    
None                

 

 Full Exam - General                     Ears/Nose/Throat                  
  oral cavity/pharynx/larynx                     Overall:                    no 
masses                    2014                    None                

 

 Full Exam - General                     Ears/Nose/Throat                  
  oral cavity/pharynx/larynx                     Mobile tongue:                
    a normal exam                    2014                    None        
        

 

 Full Exam - General                     Neck                    inspection 
of neck                    Overall:                    no carotid bruits       
             2014                    None                

 

 Full Exam - General                     Musculoskeletal                    
upper extremity                    Overall:                    normal shoulder 
                   2014                    None                

 

 Full Exam - General                     Musculoskeletal                    
head and neck                    Overall:                    head atraumatic   
                 2014                    None                

 

 Full Exam - General                     Musculoskeletal                    
head and neck                    Overall:                    cervical spine 
benign                    2014                    None                

 

 Full Exam - General                     Neurologic                    gait
                    Overall:                    no ataxia, no unsteadiness     
               2014                    None                

 

 Full Exam - General                     Neurologic                    
cranial nerves                    Overall:                    crainial nerves 2 
- 12 grossly intact                    2014                    None      
          

 

 Full Exam - Dermatology                    Constitutional                     
general appearance                    Overall:                    well 
nourished                    2013                    None                

 

 Full Exam - Dermatology                    Constitutional                     
general appearance                    Overall:                    well 
developed                    2013                    None                

 

 Full Exam - Dermatology                    Constitutional                     
general appearance                    Overall:                    in no acute 
distress                    2013                    None                

 

 Full Exam - Dermatology                    Constitutional                     
general appearance                    Overall:                    well groomed 
                   2013                    None                

 

 Full Exam - Dermatology                    Integument                    insp 
& palp - head/face                    Lesion:                    macule        
            2013                    None                

 

 Full Exam - Dermatology                    Integument                    insp 
& palp - head/face                    Appearance:                    flat-
topped                    2013                    None                

 

 Full Exam - Dermatology                    Integument                    insp 
& palp - head/face                    Number:                    one           
         2013                    None                

 

 Full Exam - Dermatology                    Psychiatric                    
orientation                    Overall:                    oriented to person, 
place and time                    2013                    None           
     

 

 Full Exam - Dermatology                    Integument                    insp 
& palp - head/face                    Color:                    black          
          2013                    None                

 

 Full Exam - Dermatology                    Integument                    insp 
& palp - head/face                    Length:                    less than 5mm 
                   2013                    None                

 

 Full Exam - Dermatology                    Integument                    insp 
& palp - head/face                    Width:                    less than 5mm  
                  2013                    None                

 

 Full Exam - Dermatology                    Integument                    insp 
& palp - head/face                    Location:                    on the right 
neck/shoulder                    2013                    skin cleansed 
with alcohol, lesion infiltrated with lidocain 1mL, then lesion cleansed with 
betadine, then draped with sterile towels, the lesion was incised with 8mm 
punch biopsy, removed with 15 blade, and then lesion closed with 3 sutures 4-0 
nylon.                

 

 Full Exam - General                     Constitutional                     
general appearance                    Overall:                    well 
developed                    2013                    None                

 

 Full Exam - General                     Constitutional                     
general appearance                    Overall:                    in no acute 
distress                    2013                    None                

 

 Full Exam - General                     Constitutional                     
general appearance                    Overall:                    well 
nourished                    2013                    None                

 

 Full Exam - General                     Eyes                    conjunctiva
/eyelids                    Overall:                    conjunctiva clear      
              2013                    None                

 

 Full Exam - General                     Eyes                    conjunctiva
/eyelids                    Overall:                    eyelids normal         
           2013                    None                

 

 Full Exam - General                     Eyes                    pupils and 
irises                    Overall:                    pupils equal, round, 
reactive to light and accomodation                    2013               
     None                

 

 Full Exam - General 1995                    Ears/Nose/Throat                  
  external ear                    Auricle:                    a normal exam    
                2013                    None                

 

 Full Exam - General                     Ears/Nose/Throat                  
  external ear                    Auricle:                    lesion           
         2013                    dry white patching to the outer left 
auricle, nontender                

 

 Full Exam - General 1995                    Ears/Nose/Throat                  
  external ear                    Auricle:                    nontender        
            2013                    None                

 

 Full Exam - General                     Ears/Nose/Throat                  
  external nose                    Overall:                    benign 
appearance                    2013                    None                

 

 Full Exam - General 1995                    Ears/Nose/Throat                  
  hearing assessment                    Gross exam:                    
diminished to whisper                    2013                    None    
            

 

 Full Exam - General 1995                    Ears/Nose/Throat                  
  lips/teeth/gingiva                    Overall:                    benign lips
                    2013                    None                

 

 Full Exam - General 1995                    Ears/Nose/Throat                  
  lips/teeth/gingiva                    Overall:                    normal 
dentition                    2013                    None                

 

 Full Exam - General 1995                    Ears/Nose/Throat                  
  lips/teeth/gingiva                    Overall:                    no masses  
                  2013                    pt with a very small posterior 
airway opening - long upper palate and large tongue base                

 

 Full Exam - General 1995                    Ears/Nose/Throat                  
  oral cavity/pharynx/larynx                     Overall:                    
oral mucosa clear                    2013                    None        
        

 

 Full Exam - General 1995                    Ears/Nose/Throat                  
  oral cavity/pharynx/larynx                     Overall:                    
mobile tongue benign                    2013                    None     
           

 

 Full Exam - General 1995                    Ears/Nose/Throat                  
  oral cavity/pharynx/larynx                     Overall:                    
hard palate benign                    2013                    None       
         

 

 Full Exam - General 1995                    Ears/Nose/Throat                  
  oral cavity/pharynx/larynx                     Overall:                    
oropharyngeal mucosa clear                    2013                    
None                

 

 Full Exam - General 1995                    Ears/Nose/Throat                  
  oral cavity/pharynx/larynx                     Overall:                    no 
masses                    2013                    None                

 

 Full Exam - General 1995                    Ears/Nose/Throat                  
  oral cavity/pharynx/larynx                     Mobile tongue:                
    a normal exam                    2013                    None        
        

 

 Full Exam - General                     Neck                    inspection 
of neck                    Overall:                    no carotid bruits       
             2013                    None                

 

 Full Exam - General                     Respiratory                    
auscultation                    Overall:                    breath sounds clear 
bilaterally                    2013                    None              
  

 

 Full Exam - General 1995                    Respiratory                    
respiratory effort/rhythm                    Overall:                    no 
retractions                    2013                    None              
  

 

 Full Exam - General                     Respiratory                    
respiratory effort/rhythm                    Overall:                    normal 
rate                    2013                    None                

 

 Full Exam - General                     Cardiovascular                    
auscultation of heart                    Rate:                    regular rate 
                   2013                    None                

 

 Full Exam - General 1995                    Cardiovascular                    
auscultation of heart                    Rhythm:                    regular 
rhythm                    2013                    None                

 

 Full Exam - General                     Cardiovascular                    
auscultation of heart                    Murmur:                    no murmur  
                  2013                    None                

 

 Full Exam - General                     Cardiovascular                    
extremities                    Overall:                    no clubbing         
           2013                    None                

 

 Full Exam - General                     Musculoskeletal                    
head and neck                    Overall:                    head atraumatic   
                 2013                    None                

 

 Full Exam - General                     Musculoskeletal                    
head and neck                    Overall:                    cervical spine 
benign                    2013                    None                

 

 Full Exam - General                     Musculoskeletal                    
upper extremity                    Overall:                    normal shoulder 
                   2013                    None                

 

 Full Exam - General                     Neurologic                    gait
                    Overall:                    no ataxia, no unsteadiness     
               2013                    None                

 

 Full Exam - General                     Neurologic                    
cranial nerves                    Overall:                    crainial nerves 2 
- 12 grossly intact                    2013                    None      
          

 

 Full Exam - General                     Psychiatric                    
orientation/consciousness                    Overall:                    
oriented to person, place and time                    2013               
     None                

 

 Full Exam - General                     Psychiatric                    
mood and affect                    Overall:                    normal mood and 
affect                    2013                    None                

 

 Full Exam - General                     Psychiatric                    
appearance                    Overall:                    well-groomed, good 
eye contact                    2013                    None              
  

 

 Full Exam - General                     Psychiatric                    
judgment/insight                    Overall:                    judgment and 
insight intact                    2013                    None           
     

 

 Full Exam - General                     Integument                    
inspection of skin                    Dermatitis:                    mole      
              2013                    dark lesion on right neck/shoulder 
2mm x 2mm almost black pigmentation                

 

 Full Exam - ENT                    Constitutional                     general 
appearance                    Overall:                    in no acute distress 
                   2012                    None                

 

 Full Exam - ENT                    Neurologic                    orientation  
                  Overall:                    oriented to person, place and 
time                    2012                    None                

 

 Full Exam - ENT                    Lymphatic                    palpation of 
lymph nodes                    Overall:                    anterior cervical 
chain benign                    2012                    None             
   

 

 Full Exam - ENT                    Lymphatic                    palpation of 
lymph nodes                    Overall:                    posterior cervical 
chain benign                    2012                    None             
   

 

 Full Exam - ENT                    Cardiovascular                    
auscultation of heart                    Overall:                    regular 
rate                    2012                    None                

 

 Full Exam - ENT                    Cardiovascular                    
auscultation of heart                    Overall:                    normal 
heart sounds                    2012                    None             
   

 

 Full Exam - ENT                    Cardiovascular                    
auscultation of heart                    Overall:                    no murmurs
                    2012                    None                

 

 Full Exam - ENT                    Respiratory                    auscultation
                    Overall:                    breath sounds clear bilaterally
                    2012                    None                

 

 Full Exam - ENT                    Face and Head                    palpation 
                   Overall:                    no sinus tenderness             
       2012                    None                

 

 Full Exam - ENT                    Ears/Nose/Throat                    
otoscopic exam                    Overall:                    external auditory 
canals normal                    2012                    None            
    

 

 Full Exam - ENT                    Ears/Nose/Throat                    
otoscopic exam                    Overall:                    tympanic 
membranes normal                    2012                    None         
       

 

 Full Exam - ENT                    Ears/Nose/Throat                    
oropharynx                    Overall:                    oral mucosa clear    
                2012                    None                

 

 Full Exam - ENT                    Constitutional                     general 
appearance                    Overall:                    well nourished       
             2012                    None                

 

 Full Exam - ENT                    Constitutional                     general 
appearance                    Overall:                    well developed       
             2012                    None                

 

 Full Exam - General                     Respiratory                    
respiratory effort/rhythm                    Overall:                    no 
retractions                    2012                    None              
  

 

 Full Exam - General                     Respiratory                    
respiratory effort/rhythm                    Overall:                    normal 
rate                    2012                    None                

 

 Full Exam - General                     Cardiovascular                    
auscultation of heart                    Rate:                    regular rate 
                   2012                    None                

 

 Full Exam - General                     Cardiovascular                    
auscultation of heart                    Rhythm:                    regular 
rhythm                    2012                    None                

 

 Full Exam - General                     Cardiovascular                    
extremities                    Overall:                    no clubbing         
           2012                    None                

 

 Full Exam - General                     Musculoskeletal                    
head and neck                    Overall:                    head atraumatic   
                 2012                    None                

 

 Full Exam - General                     Musculoskeletal                    
head and neck                    Overall:                    cervical spine 
benign                    2012                    None                

 

 Full Exam - General                     Musculoskeletal                    
upper extremity                    Overall:                    normal shoulder 
                   2012                    None                

 

 Full Exam - General                     Neurologic                    gait
                    Overall:                    no ataxia, no unsteadiness     
               2012                    None                

 

 Full Exam - General                     Neurologic                    
cranial nerves                    Overall:                    crainial nerves 2 
- 12 grossly intact                    2012                    None      
          

 

 Full Exam - General                     Psychiatric                    
orientation/consciousness                    Overall:                    
oriented to person, place and time                    2012               
     None                

 

 Full Exam - General                     Psychiatric                    
mood and affect                    Overall:                    normal mood and 
affect                    2012                    None                

 

 Full Exam - General                     Psychiatric                    
appearance                    Overall:                    well-groomed, good 
eye contact                    2012                    None              
  

 

 Full Exam - General                     Psychiatric                    
judgment/insight                    Overall:                    judgment and 
insight intact                    2012                    None           
     

 

 Full Exam - General                     Cardiovascular                    
auscultation of heart                    Murmur:                    no murmur  
                  2012                    None                

 

 Full Exam - General                     Integument                    
inspection of skin                    Dermatitis:                    thickened 
                   2012                    None                

 

 Full Exam - General 1995                    Integument                    
inspection of skin                    Dermatitis:                    dryness/
flaking                    2012                    None                

 

 Full Exam - General                     Integument                    
inspection of skin                    Location:                    face        
            2012                    right neck/jawline actinic keratosis 
               

 

 Full Exam - General 1995                    Ears/Nose/Throat                  
  oral cavity/pharynx/larynx                     Overall:                    
mobile tongue benign                    2012                    None     
           

 

 Full Exam - General 1995                    Ears/Nose/Throat                  
  oral cavity/pharynx/larynx                     Overall:                    
hard palate benign                    2012                    None       
         

 

 Full Exam - General 1995                    Ears/Nose/Throat                  
  oral cavity/pharynx/larynx                     Overall:                    
oropharyngeal mucosa clear                    2012                    
None                

 

 Full Exam - General 1995                    Ears/Nose/Throat                  
  oral cavity/pharynx/larynx                     Overall:                    no 
masses                    2012                    None                

 

 Full Exam - General                     Constitutional                     
general appearance                    Overall:                    well 
developed                    2012                    None                

 

 Full Exam - General                     Constitutional                     
general appearance                    Overall:                    in no acute 
distress                    2012                    None                

 

 Full Exam - General                     Constitutional                     
general appearance                    Overall:                    well 
nourished                    2012                    None                

 

 Full Exam - General                     Eyes                    conjunctiva
/eyelids                    Overall:                    conjunctiva clear      
              2012                    None                

 

 Full Exam - General                     Eyes                    conjunctiva
/eyelids                    Overall:                    eyelids normal         
           2012                    None                

 

 Full Exam - General                     Eyes                    pupils and 
irises                    Overall:                    pupils equal, round, 
reactive to light and accomodation                    2012               
     None                

 

 Full Exam - General 1995                    Ears/Nose/Throat                  
  oral cavity/pharynx/larynx                     Mobile tongue:                
    a normal exam                    2012                    None        
        

 

 Full Exam - General                     Neck                    inspection 
of neck                    Overall:                    no carotid bruits       
             2012                    None                

 

 Full Exam - General                     Respiratory                    
auscultation                    Overall:                    breath sounds clear 
bilaterally                    2012                    None              
  

 

 Full Exam - General                     Ears/Nose/Throat                  
  external ear                    Auricle:                    a normal exam    
                2012                    None                

 

 Full Exam - General                     Ears/Nose/Throat                  
  external ear                    Auricle:                    lesion           
         2012                    dry white patching to the outer left 
auricle, nontender                

 

 Full Exam - General 1995                    Ears/Nose/Throat                  
  external ear                    Auricle:                    nontender        
            2012                    None                

 

 Full Exam - General                     Ears/Nose/Throat                  
  external nose                    Overall:                    benign 
appearance                    2012                    None                

 

 Full Exam - General                     Ears/Nose/Throat                  
  hearing assessment                    Gross exam:                    
diminished to whisper                    2012                    None    
            

 

 Full Exam - General                     Ears/Nose/Throat                  
  lips/teeth/gingiva                    Overall:                    benign lips
                    2012                    None                

 

 Full Exam - General                     Ears/Nose/Throat                  
  lips/teeth/gingiva                    Overall:                    normal 
dentition                    2012                    None                

 

 Full Exam - General                     Ears/Nose/Throat                  
  lips/teeth/gingiva                    Overall:                    no masses  
                  2012                    pt with a very small posterior 
airway opening - long upper palate and large tongue base                

 

 Full Exam - General                     Ears/Nose/Throat                  
  oral cavity/pharynx/larynx                     Overall:                    
oral mucosa clear                    2012                    None        
        

 

 Full Exam - Dermatology                    Constitutional                     
general appearance                    Overall:                    well 
nourished                    2012                    None                

 

 Full Exam - Dermatology                    Constitutional                     
general appearance                    Overall:                    well 
developed                    2012                    None                

 

 Full Exam - Dermatology                    Constitutional                     
general appearance                    Overall:                    in no acute 
distress                    2012                    None                

 

 Full Exam - Dermatology                    Constitutional                     
general appearance                    Overall:                    well groomed 
                   2012                    None                

 

 Full Exam - Dermatology                    Integument                    insp 
& palp - head/face                    Lesion:                    macule        
            2012                    None                

 

 Full Exam - Dermatology                    Integument                    insp 
& palp - head/face                    Location:                    on the left 
jaw                    2012                    None                

 

 Full Exam - Dermatology                    Integument                    insp 
& palp - head/face                    Color:                    dark brown     
               2012                    None                

 

 Full Exam - Dermatology                    Integument                    insp 
& palp - head/face                    Appearance:                    flat-
topped                    2012                    None                

 

 Full Exam - Dermatology                    Integument                    insp 
& palp - head/face                    Number:                    one           
         2012                    None                

 

 Full Exam - Dermatology                    Integument                    insp 
& palp - head/face                    Length:                    5-10mm        
            2012                    None                

 

 Full Exam - Dermatology                    Integument                    insp 
& palp - head/face                    Width:                    5-10mm         
           2012                    None                

 

 Full Exam - Dermatology                    Integument                    insp 
& palp - chest/axillae                    Lesion:                    macule    
                2012                    None                

 

 Full Exam - Dermatology                    Integument                    insp 
& palp - chest/axillae                    Location:                    on the 
right chest                    2012                    None              
  

 

 Full Exam - Dermatology                    Integument                    insp 
& palp - chest/axillae                    Color:                    george        
            2012                    None                

 

 Full Exam - Dermatology                    Integument                    insp 
& palp - chest/axillae                    Appearance:                    
crusted                    2012                    None                

 

 Full Exam - Dermatology                    Integument                    insp 
& palp - chest/axillae                    Shape:                    round      
              2012                    None                

 

 Full Exam - Dermatology                    Integument                    insp 
& palp - chest/axillae                    Number:                    one       
             2012                    None                

 

 Full Exam - Dermatology                    Integument                    insp 
& palp - chest/axillae                    Length:                    1cm       
             2012                    None                

 

 Full Exam - Dermatology                    Integument                    insp 
& palp - chest/axillae                    Width:                    1cm        
            2012                    None                

 

 Full Exam - Dermatology                    Integument                    insp 
& palp - back                    Lesion:                    macule             
       2012                    None                

 

 Full Exam - Dermatology                    Integument                    insp 
& palp - back                    Location:                    on the right 
upper back                    2012                    None                

 

 Full Exam - Dermatology                    Integument                    insp 
& palp - back                    Location:                    on the right mid 
back                    2012                    None                

 

 Full Exam - Dermatology                    Integument                    insp 
& palp - back                    Location:                    on the mid back  
                  2012                    None                

 

 Full Exam - Dermatology                    Integument                    insp 
& palp - back                    Location:                    on the left 
shoulder                    2012                    None                

 

 Full Exam - Dermatology                    Integument                    insp 
& palp - back                    Color:                    dark brown          
          2012                    None                

 

 Full Exam - Dermatology                    Integument                    insp 
& palp - back                    Shape:                    irregular           
         2012                    None                

 

 Full Exam - Dermatology                    Integument                    insp 
& palp - back                    Number:                    four               
     2012                    None                

 

 Full Exam - Dermatology                    Integument                    insp 
& palp - back                    Length:                    5-10mm             
       2012                    None                

 

 Full Exam - Dermatology                    Integument                    insp 
& palp - back                    Width:                    5-10mm              
      2012                    None                

 

 Full Exam - Dermatology                    Psychiatric                    
orientation                    Overall:                    oriented to person, 
place and time                    2012                    None           
     

 

 Full Exam - General                     Ears/Nose/Throat                  
  external nose                    Overall:                    benign 
appearance                    2012                    None                

 

 Full Exam - General                     Ears/Nose/Throat                  
  otoscopic exam                    External auditory canal:                    
complete cerumen impaction                    2012                    
None                

 

 Full Exam - General                     Ears/Nose/Throat                  
  otoscopic exam                    Tympanic membrane:                    not 
visualized                    2012                     the TM's were not 
initially visualized, but after removal of the impacted cerumen, there was 
evidence of tympanosclerosis bilaterally                

 

 Full Exam - General 1995                    Ears/Nose/Throat                  
  hearing assessment                    Gross exam:                    
diminished to whisper                    2012                    None    
            

 

 Full Exam - General 1995                    Ears/Nose/Throat                  
  lips/teeth/gingiva                    Overall:                    benign lips
                    2012                    None                

 

 Full Exam - General 1995                    Ears/Nose/Throat                  
  lips/teeth/gingiva                    Overall:                    normal 
dentition                    2012                    None                

 

 Full Exam - General 1995                    Ears/Nose/Throat                  
  oral cavity/pharynx/larynx                     Overall:                    
oral mucosa clear                    2012                    None        
        

 

 Full Exam - General 1995                    Ears/Nose/Throat                  
  oral cavity/pharynx/larynx                     Overall:                    
mobile tongue benign                    2012                    None     
           

 

 Full Exam - General 1995                    Ears/Nose/Throat                  
  oral cavity/pharynx/larynx                     Overall:                    
hard palate benign                    2012                    None       
         

 

 Full Exam - General 1995                    Ears/Nose/Throat                  
  oral cavity/pharynx/larynx                     Overall:                    
oropharyngeal mucosa clear                    2012                    
None                

 

 Full Exam - General 1995                    Ears/Nose/Throat                  
  oral cavity/pharynx/larynx                     Overall:                    no 
masses                    2012                    None                

 

 Full Exam - General 1995                    Ears/Nose/Throat                  
  lips/teeth/gingiva                    Overall:                    no masses  
                  2012                    pt with a very small posterior 
airway opening - long upper palate and large tongue base                

 

 Full Exam - General                     Ears/Nose/Throat                  
  oral cavity/pharynx/larynx                     Mobile tongue:                
    a normal exam                    2012                    None        
        

 

 Full Exam - General 1995                    Ears/Nose/Throat                  
  oral cavity/pharynx/larynx                     Overall:                    no 
masses                    2012                    None                

 

 Full Exam - General                     Neck                    thyroid   
                 Overall:                    normal size                                        None                

 

 Full Exam - General                     Neck                    thyroid   
                 Overall:                    no mass lesions                    
2012                    None                

 

 Full Exam - General                     Neck                    inspection 
of neck                    Overall:                    no carotid bruits       
             2012                    None                

 

 Full Exam - General                     Respiratory                    
auscultation                    Overall:                    breath sounds clear 
bilaterally                    2012                    None              
  

 

 Full Exam - General                     Respiratory                    
respiratory effort/rhythm                    Overall:                    normal 
rate                    2012                    None                

 

 Full Exam - General                     Respiratory                    
respiratory effort/rhythm                    Overall:                    no 
retractions                    2012                    None              
  

 

 Full Exam - General                     Cardiovascular                    
auscultation of heart                    Rate:                    regular rate 
                   2012                    None                

 

 Full Exam - General                     Cardiovascular                    
auscultation of heart                    Rhythm:                    regular 
rhythm                    2012                    None                

 

 Full Exam - General                     Cardiovascular                    
auscultation of heart                    Murmur:                    no murmur  
                  2012                    None                

 

 Full Exam - General                     Cardiovascular                    
inspection of carotid pulses                    Overall:                    
strong, bilaterally equal, no bruits                    2012             
       None                

 

 Full Exam - General                     Cardiovascular                    
extremities                    Overall:                    no clubbing         
           2012                    None                

 

 Full Exam - General                     Chest/Breast                    
breast/chest inspection                    Overall:                    normal 
chest shape                    2012                    None              
  

 

 Full Exam - General                     Abdomen                    
abdominal exam                    Overall:                    no tenderness    
                2012                    None                

 

 Full Exam - General                     Abdomen                    
abdominal exam                    Overall:                    normal bowel 
sounds                    2012                    None                

 

 Full Exam - General                     Abdomen                    liver 
and spleen exam                    Overall:                    no 
hepatosplenomegaly                    2012                    None       
         

 

 Full Exam - General                     Abdomen                    liver 
and spleen exam                    Overall:                    no stigmata of 
chronic liver disease                    2012                    None    
            

 

 Full Exam - General                     Musculoskeletal                    
head and neck                    Overall:                    cervical spine 
benign                    2012                    None                

 

 Full Exam - General                     Musculoskeletal                    
head and neck                    Overall:                    head atraumatic   
                 2012                    None                

 

 Full Exam - General                     Musculoskeletal                    
upper extremity                    Overall:                    normal shoulder 
                   2012                    None                

 

 Full Exam - General                     Neurologic                    
cranial nerves                    Overall:                    crainial nerves 2 
- 12 grossly intact                    2012                    None      
          

 

 Full Exam - General                     Integument                    
inspection of skin                    Dermatitis:                    erythema  
                  2012                    in a wide pattern across 
shoulders, tapers to a smaller area of erythema from shoulders to the middle of 
lower back                

 

 Full Exam - General                     Integument                    
inspection of skin                    Hair:                    normal 
distribution                    2012                    None             
   

 

 Full Exam - General                     Integument                    
inspection of skin                    Location:                    see diagram 
                   2012                    several lesions noted - dark 
macule over the left cheek/jawline near mandibular process, on right upper chest
, irritated lesion appears to be seborrheic keratosis, on the lateral left 
deltoid - very irritated base, with dry white cap, but with a central dimple, 
is not yet ulcerated, several other scattered seborrheic keratosis noted on the 
upper back.                

 

 Full Exam - General                     Constitutional                     
general appearance                    Overall:                    well 
nourished                    2012                    None                

 

 Full Exam - General                     Constitutional                     
general appearance                    Overall:                    well 
developed                    2012                    None                

 

 Full Exam - General                     Constitutional                     
general appearance                    Overall:                    in no acute 
distress                    2012                    None                

 

 Full Exam - General                     Eyes                    pupils and 
irises                    Overall:                    pupils equal, round, 
reactive to light and accomodation                    2012               
     None                

 

 Full Exam - General 1995                    Eyes                    conjunctiva
/eyelids                    Overall:                    conjunctiva clear      
              2012                    None                

 

 Full Exam - General 1995                    Eyes                    conjunctiva
/eyelids                    Overall:                    eyelids normal         
           2012                    None                

 

 Full Exam - General 1995                    Ears/Nose/Throat                  
  external ear                    Auricle:                    a normal exam    
                2012                    None                

 

 Full Exam - General 1995                    Ears/Nose/Throat                  
  external ear                    Auricle:                    lesion           
         2012                    dry white patching to the outer left 
auricle, nontender                

 

 Full Exam - General                     Ears/Nose/Throat                  
  external ear                    Auricle:                    nontender        
            2012                    None                

 

 Full Exam - General 1995                    Psychiatric                    
orientation/consciousness                    Overall:                    
oriented to person, place and time                    2012               
     None                

 

 Full Exam - General                     Psychiatric                    
mood and affect                    Overall:                    normal mood and 
affect                    2012                    None                

 

 Full Exam - General                     Psychiatric                    
appearance                    Overall:                    well-groomed, good 
eye contact                    2012                    None              
  

 

 Full Exam - General                     Psychiatric                    
speech                    Overall:                    normal quality, quantity, 
rate                    2012                    None                

 

 Full Exam - General                     Psychiatric                    
thought                    Overall:                    normal form and content 
                   2012                    None                

 

 Full Exam - General                     Psychiatric                    
judgment/insight                    Overall:                    judgment and 
insight intact                    2012                    None           
     

 

 Full Exam - General                     Neurologic                    deep 
tendon reflexes                    Overall:                    deep tendon 
reflexes intact                    2012                    None          
      

 

 Full Exam - General                     Neurologic                    gait
                    Overall:                    no ataxia, no unsteadiness     
               2012                    None                



                                                                              



Procedures

                      





 Procedure                    Codes                    Date                

 

                     PNEUMOCOCCAL VACC 13 HOMA IM

Formatting Model/CDA Sections, Assigned to                                      
SNOMED CT: [id number]

CPT-4: 78815Ldmuijr                    2016                

 

                     IMMUNIZATION ADMIN                                      CPT
-4: 31853                    2016                

 

                     BIOPSY SKIN LESION                                      CPT
-4: 07433                    2015                

 

                     BIOPSY SKIN LESION                                      CPT
-4: 64086                    2013                

 

                     ROUTINE VENIPUNCTURE                                      
CPT-4: 56146                    2013                

 

                     DESTRUCT PREMALG LESION                                   
   CPT-4: 19178                    2012                

 

                     DESTRUCT PREMALG LESION                                   
   CPT-4: 17264                    2012                

 

                     BIOPSY SKIN LESION                                      CPT
-4: 19262                    2012                

 

                     BIOPSY SKIN ADD-ON                                      CPT
-4: 26088                    2012                

 

                     REMOVE IMPACTED EAR WAX UNI                               
       CPT-4: 45140                    2012                



                                                                               
                                                                               
          



Vital Signs

                      





 Date                    Vital                









 2018                                        Blood Pressure 1: 116/62 Code
: 8480-6                                                          BMI: 28.6 Code
: 90033-9                                                          Heart Rate 1
: 81 bpm                                                          Height: 6'   
                                                       SpO2: 95%               
                                           Weight: 211 lbs                     
              

 

 2017                                        Blood Pressure 1: 122/82 Code
: 8480-6                                                          BMI: 28.9 Code
: 91085-6                                                          Heart Rate 1
: 78 bpm                                                          Height: 6'   
                                                       SpO2: 96%               
                                           Weight: 213 lbs                     
              

 

 2017                                        Blood Pressure 1: 144/90 Code
: 8480-6                                                          BMI: 29.4 Code
: 66219-2                                                          Heart Rate 1
: 60 bpm                                                          Height: 6'   
                                                       SpO2: 97%               
                                           Weight: 217 lbs                     
              

 

 2016                                        Blood Pressure 1: 146/84 Code
: 8480-6                                                          BMI: 28.9 Code
: 05759-3                                                          Heart Rate 1
: 90 bpm                                                          Height: 6'   
                                                       SpO2: 97%               
                                           Weight: 213 lbs                     
              

 

 2016                                        Blood Pressure 1: 110/58 Code
: 8480-6                                                          BMI: 28.8 Code
: 78652-8                                                          Heart Rate 1
: 68 bpm                                                          Height: 6'   
                                                       SpO2: 98%               
                                           Weight: 212 lbs                     
              









 2015                                        Blood Pressure 1: 130/88 Code
: 8480-6                                  









 2015                                        Blood Pressure 1: 136/88 Code
: 8480-6                                                          BMI: 29.3 Code
: 72964-9                                                          Heart Rate 1
: 88 bpm                                                          Height: 6'   
                                                       Weight: 216 lbs         
                          

 

 2014                                        Blood Pressure 1: 120/68 Code
: 8480-6                                                          BMI: 29.0 Code
: 35093-5                                                          Heart Rate 1
: 100 bpm                                                          Height: 6'  
                                                        Weight: 213 lbs 8 oz   
                               

 

 2013                                        Blood Pressure 1: 128/86 Code
: 8480-6                                                          BMI: 29.0 Code
: 93281-4                                                          Heart Rate 1
: 80 bpm                                                          Height: 6'   
                                                       Weight: 214 lbs         
                          

 

 2013                                        Blood Pressure 1: 122/72 Code
: 8480-6                                                          BMI: 29.0 Code
: 64881-4                                                          Heart Rate 1
: 80 bpm                                                          Height: 6'   
                                                       Weight: 214 lbs         
                          









 2012                                        Blood Pressure 1: 136/80 Code
: 8480-6                                                          Heart Rate 1: 
56 bpm                                                          Temperature: 
36.8 (C) / 98.3 (F)                                                          
Weight: 218 lbs                                   









 2012                                        Blood Pressure 1: 130/84 Code
: 8480-6                                                          BMI: 29.2 Code
: 38714-6                                                          Heart Rate 1
: 56 bpm                                                          Height: 6'   
                                                       Respiratory Rate: 16 bpm
                                                          Weight: 215 lbs 8 oz 
                                 









 2012                                        Blood Pressure 1: 132/76 Code
: 8480-6                                                          Heart Rate 1: 
60 bpm                                  









 2012                                        Blood Pressure 1: 120/70 Code
: 8480-6                                                          BMI: 29.4 Code
: 02268-5                                                          Heart Rate 1
: 76 bpm                                                          Height: 6'   
                                                       Respiratory Rate: 20 bpm
                                                          Weight: 217 lbs      
                             



                                                                               
                                                                               
                                                            



Functional Status

          No Functional Status data                                            
              



History of Present Illness

                      





 Symptom Name                    Status                    Result              
      Effective Date                    Notes                

 

 hyperlipidemia                    Onset and Resolution                    
ongoing                    2018                    None                

 

 hyperlipidemia                    Onset of Symptom                    during 
adulthood                    2018                    None                

 

 hyperlipidemia                    Severity                    moderate        
            2018                    None                

 

 hyperlipidemia                    Alleviating Factors                    
medication                    2018                    None                

 

 hyperlipidemia                    Exacerbating Factors                    diet
                    2018                    None                

 

 hyperlipidemia                    Quality                    chronic          
          2018                    None                

 

 hyperlipidemia                    Significant Family History                  
  hyperlipidemia                    2018                    None         
       

 

 hyperlipidemia                    Pertinent Findings                    Denies 
cold intolerance                    2018                    None         
       

 

 hyperlipidemia                    Pertinent Findings                    Denies 
edema                    2018                    None                

 

 hyperlipidemia                    Pertinent Findings                    Denies 
fever                    2018                    None                

 

 hyperlipidemia                    Onset and Resolution                    
ongoing                    2017                    None                

 

 hyperlipidemia                    Onset of Symptom                    during 
adulthood                    2017                    None                

 

 hyperlipidemia                    Severity                    moderate        
            2017                    None                

 

 hyperlipidemia                    Alleviating Factors                    
medication                    2017                    None                

 

 hyperlipidemia                    Exacerbating Factors                    diet
                    2017                    None                

 

 hyperlipidemia                    Quality                    chronic          
          2017                    None                

 

 hyperlipidemia                    Significant Family History                  
  hyperlipidemia                    2017                    None         
       

 

 well man exam (65+ years)                    Lifestyle                    
regular seatbelt use                    2017                    None     
           

 

 well man exam (65+ years)                    Lifestyle                    
normal sleep patterns                    2017                    None    
            

 

 well man exam (65+ years)                    Lifestyle                    
normal amount of stress                    2017                    None  
              

 

 rash                    Location-Extremities                    on the right 
leg                    2017                    None                

 

 rash                    Quality                    chronic                    
2017                    None                

 

 rash                    Color                    pink                    2017                    None                

 

 rash                    Onset and Resolution                    gradual in 
onset                    2017                    None                

 

 rash                    Onset of Symptom                    3 months ago      
              2017                    None                

 

 rash                    Severity                    asymptomatic              
      2017                    None                

 

 rash                    Prior Treatments                    previously 
untreated                     2017                    None                

 

 rash                    Triggers                    no known triggers         
           2017                    None                

 

 rash                    Alleviating Factors                    no alleviating 
factors                    2017                    None                

 

 skin lesion                    Location                    in the 
submandibular area                    2016                    right lower 
jaw behind right ear                 

 

 cough                    Location                    in the lung              
      2016                    None                

 

 cough                    Quality                    hacking                    
2016                    None                

 

 cough                    Quality                    productive                
    2016                    None                

 

 cough                    Onset and Resolution                    sudden in 
onset                    2016                    None                

 

 cough                    Onset of Symptom                    4 days ago       
             2016                    None                

 

 cough                    Limitation on Activities                    does not 
limit activities                    2016                    None         
       

 

 cough                    Frequency of Episodes                    daily       
             2016                    None                

 

 cough                    Alleviating Factors                    OTC 
medications                    2016                    None              
  

 

 cough                    Pertinent Findings                    chest 
discomfort                    2016                    None                

 

 sore throat                    Location                    on both sides      
              2016                    None                

 

 sore throat                    Quality                    dull                
    2016                    None                

 

 sore throat                    Quality                    scratchy            
        2016                    None                

 

 sore throat                    Onset and Resolution                    sudden 
in onset                    2016                    None                

 

 sore throat                    Onset of Symptom                    4 days ago 
                   2016                    None                

 

 sore throat                    Limitation on Activities                    
does not limit oral intake                    2016                    
None                

 

 sore throat                    Frequency of Episodes                    daily 
                   2016                    None                

 

 sore throat                    Pertinent Findings                    cough    
                2016                    None                

 

 sore throat                    Pertinent Findings                    
hoarseness                    2016                    None                

 

 office procedure                    Procedure to be performed                 
   excision                    2015                    None              
  

 

 office procedure                    Reason for Procedure                    
changing mole, uncomfortable, with flaking off and red, irritated base on left 
arm                    2015                    None                

 

 office procedure                    Additional Comments                    
history of abnormal moles                    2015                    None
                

 

 hyperlipidemia                    Onset and Resolution                    
ongoing                    2015                    None                

 

 hyperlipidemia                    Onset of Symptom                    during 
adulthood                    2015                    None                

 

 hyperlipidemia                    Severity                    moderate        
            2015                    None                

 

 hyperlipidemia                    Alleviating Factors                    
medication                    2015                    None                

 

 hyperlipidemia                    Exacerbating Factors                    diet
                    2015                    None                

 

 hyperlipidemia                    Quality                    chronic          
          2015                    None                

 

 hyperlipidemia                    Significant Family History                  
  hyperlipidemia                    2015                    None         
       

 

 mole check                    Location-Major                    on the arms   
                 2015                    None                

 

 hyperlipidemia                    Onset and Resolution                    
ongoing                    2014                    None                

 

 skin lesion                    Quality                    enlarging           
         2014                    None                

 

 skin lesion                    Location                    chest              
      2014                    None                

 

 skin lesion                    Onset and Resolution                    ongoing
                    2014                    None                

 

 hyperlipidemia                    Onset of Symptom                    during 
adulthood                    2014                    None                

 

 hyperlipidemia                    Severity                    moderate        
            2014                    None                

 

 hyperlipidemia                    Alleviating Factors                    
medication                    2014                    None                

 

 hyperlipidemia                    Exacerbating Factors                    diet
                    2014                    None                

 

 hyperlipidemia                    Significant Family History                  
  hyperlipidemia                    2014                    None         
       

 

 hyperlipidemia                    Quality                    chronic          
          2014                    None                

 

 skin lesion                    Quality                    pigmented           
         2013                    None                

 

 skin lesion                    Onset and Resolution                    gradual 
in onset                    2013                    None                

 

 skin lesion                    Severity                    mild               
     2013                    None                

 

 skin lesion                    Triggers                    sun exposure       
             2013                    None                

 

 skin lesion                    Location                    in the lower neck 
area                    2013                    on the right             
   

 

 hyperlipidemia                    Onset of Symptom                    during 
adulthood                    2013                    None                

 

 hyperlipidemia                    Severity                    moderate        
            2013                    None                

 

 hyperlipidemia                    Triggers                    no known 
associated factors                    2013                    None       
         

 

 hyperlipidemia                    Exacerbating Factors                    diet
                    2013                    None                

 

 hyperlipidemia                    Pertinent Findings                    Denies 
insulin resistance                    2013                    None       
         

 

 hyperlipidemia                    Pertinent Findings                    Denies 
nausea                    2013                    None                

 

 hyperlipidemia                    Pertinent Findings                    Denies 
obesity                    2013                    None                

 

 hyperlipidemia                    Pertinent Findings                    Denies 
polyuria                    2013                    None                

 

 hyperlipidemia                    Pertinent Findings                    Denies 
weight loss                    2013                    None              
  

 

 hyperlipidemia                    Quality                    acute            
        2013                    None                

 

 hyperlipidemia                    Quality                    increased LDL    
                2013                    None                

 

 hyperlipidemia                    Significant Family History                  
  hyperlipidemia                    2013                    None         
       

 

 hyperlipidemia                    Significant Family History                  
  cardiac disease                    2013                    None        
        

 

 hoarseness                    Quality                    acute                
    2012                    None                

 

 hoarseness                    Onset and Resolution                    sudden 
in onset                    2012                    None                

 

 hoarseness                    Pertinent Findings                    cough     
               2012                    None                

 

 hoarseness                    Onset of Symptom                    1 weeks ago 
                   2012                    None                

 

 skin lesion                    Quality                    scabbed             
       2012                    None                

 

 skin lesion                    Quality                    red                 
   2012                    None                

 

 skin lesion                    Onset of Symptom                    10 days ago
                    2012                    burned right forearm on grill
                 

 

 hoarseness                    Limitation on Activities                    does 
not limit communication                    2012                    None  
              

 

 hoarseness                    Frequency of Episodes                    
increasing                    2012                    None                

 

 hoarseness                    Triggers                    no known associated 
factors                    2012                    None                

 

 hoarseness                    Pertinent Findings                    Denies 
facial pain                    2012                    None              
  

 

 skin lesion                    Quality                    raised              
      2012                    None                

 

 skin lesion                    Quality                    scabbed             
       2012                    None                

 

 skin lesion                    Quality                    worsening           
         2012                    None                

 

 skin lesion                    Quality                    firm                
    2012                    None                

 

 skin lesion                    Severity                    mild               
     2012                    None                

 

 skin lesion                    Triggers                    no known associated 
factors                    2012                    None                

 

 skin lesion                    Alleviating Factors                    no 
alleviating factors                    2012                    None      
          

 

 skin lesion                    Pertinent Findings                    Denies 
cough                    2012                    None                

 

 skin lesion                    Location                    diffusely          
          2012                    None                

 

 skin lesion                    Onset of Symptom                    2 weeks ago
                    2012                    None                

 

 skin lesion                    Location                    diffusely          
          2012                    None                

 

 skin lesion                    Quality                    acute               
     2012                    None                

 

 skin lesion                    Quality                    firm                
    2012                    None                

 

 skin lesion                    Quality                    worsening           
         2012                    None                

 

 skin lesion                    Severity                    mild               
     2012                    None                

 

 skin lesion                    Triggers                    no known associated 
factors                    2012                    None                

 

 skin lesion                    Alleviating Factors                    no 
alleviating factors                    2012                    None      
          

 

 skin lesion                    Pertinent Findings                    Denies 
cough                    2012                    None                

 

 cough                    Quality                    dry                                        None                

 

 cough                    Onset and Resolution                    ongoing      
              2012                    states it has been ongoing for long 
time                

 

 new lesion                    Location-Major                    on the chest  
                  2012                    None                

 

 dyspnea                    Quality                    shortness of breath     
               2012                    None                

 

 dyspnea                    Limitation on Activities                    does 
not limit activities                    2012                    None     
           

 

 dyspnea                    Pertinent Findings                    Denies back 
pain                    2012                    None                

 

 dyspnea                    Pertinent Findings                    Denies 
palpitations                    2012                    None             
   

 

 dyspnea                    Pertinent Findings                    Denies 
somnolence                    2012                    but he has to take 
a sleeping pill to sleep                



                                                                    



Advance Directives

          No Advance Directive data                                            
                        



Encounters

                      





 Encounter                    Performer                    Location            
        Codes                    Date                

 

                     ( [id number] EST. PATIENT, LEVEL IV

Diagnosis: Mixed hyperlipidemia[ICD10: E78.2]

Diagnosis: Vitamin D deficiency, unspecified[ICD10: E55.9]

Diagnosis: Personal history of malignant neoplasm of prostate[ICD10: Z85.46]   
                                   Katt Garcia MD, [facility]                    CPT-4: 87838                    2018 
               

 

                     ([id number]) [id number] EST. PATIENT, LEVEL IV

Diagnosis: Mixed hyperlipidemia[ICD10: E78.2]

Diagnosis: Vitamin D deficiency, unspecified[ICD10: E55.9]

Diagnosis: Actinic keratosis[ICD10: L57.0]                                      
Katt Garcia MD, [facility]                    CPT-
4: 23305                    2017                

 

                     ([id number]) PER PM REEVAL EST PAT 65+ YR

Diagnosis: Encounter for general adult medical examination without abnormal 
findings[ICD10: Z00.00]

Diagnosis: Inflamed seborrheic keratosis[ICD10: L82.0]                         
             Katt Garcia MD, [facility]          
          CPT-4: 14960                    2017                

 

                     ([id number]) PER PM REEVAL EST PAT 65+ YR

Diagnosis: Encounter for general adult medical examination without abnormal 
findings[ICD10: Z00.00]                                      Katt Garcia MD, [facility]                    CPT-4: 47388         
           2016                

 

                     ([id number]) PER PM REEVAL EST PAT 65+ YR

Diagnosis: Encounter for general adult medical examination with abnormal 
findings[ICD10: Z00.01]

Diagnosis: Encounter for immunization[ICD10: Z23]                              
        Katt Garcia MD, [facility]               
     CPT-4: 92353                    2016                

 

                     ([id number]) PER PM REEVAL EST PAT 65+ YR

Diagnosis: Routine medical exam[ICD9: V70.0]                                   
   Katt Garcia MD, [facility]                    
CPT-4: 82758                    2015                

 

                     ([id number]) 01127 EST. PATIENT, LEVEL IV

Diagnosis: Elevated PSA[ICD9: 790.93]

Diagnosis: HYPERLIPIDEMIA[ICD9: 272.4]                                      
Katt Garcia MD, [facility]                    CPT-
4: 20355                    2014                

 

                     ([id number]) 83045 EST. PATIENT, LEVEL IV

Diagnosis: HYPERLIPIDEMIA[ICD9: 272.4]

Diagnosis: SKIN DISORDER[ICD9: 709.9]

Diagnosis: Elevated PSA measurement[ICD9: 790.93]

Diagnosis: HEARING LOSS[ICD9: 389.9]                                      Katt Garcia MD, [facility]                    CPT-4: 
14934                    2013                

 

                     ([id number]) 31158 EST. PATIENT, LEVEL III

Diagnosis: ACUTE URI[ICD9: 465.9]                                      
Jackie Garcia MD, [facility]                    
CPT-4: 85838                    2012                

 

                     ([id number]) 05947 EST. PATIENT, LEVEL IV

Diagnosis: Renal insufficiency[ICD9: 593.9]

Diagnosis: HEARING LOSS[ICD9: 389.9]                                      Katt Garcia MD, [facility]                    CPT-4: 
71750                    2012                

 

                     ([id number]) OFFICE  VISIT, NEW - LEVEL 4

Diagnosis: Lumbago[ICD9: 724.2]

Diagnosis: Cough[ICD9: 786.2]

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

Diagnosis: Overweight[ICD9: 278.02]

Diagnosis: Skin lesions, generalized[ICD9: 709.9]                              
        [facility]               
     CPT-4: 81170                    2012                



                                                                               
                                                                               
                                                            



Plan of Care

                      





 Planned Activity                    Notes                    Codes            
        Status                    Date                

 

 Visit Plan:                     Hyperlipidemia - monitor labs - pt has been 
able to adjust his diet and improve his cholesterol without medication. Hx of 
skin cancer - no new lesions today- monitor - call if new skin changes occur. 
Prostate cancer - keep appts with [facility] for intermittent monitoring. Vitamin D 
deficiency - repeat labs today.

                                                             2018        
        

 

 Patient Education: Patient Medication Summary                                 
                            Completed                    2018            
    

 

 Care Plan: Comp Metabolic                                                     
        Pending                    2018                

 

 Care Plan: Lipid                                                             
Pending                    2018                

 

 Care Plan: Vitamin D  25 Oh                                                   
          Pending                    2018                

 

 Care Plan: Tsh                                                             
Pending                    2018                

 

 Care Plan: Cbc With Differential                                              
               Pending                    2018                

 

 Appointment: Katt Garcia 

WPtel:[phone]

 [address] [facility]

                                                             (15 min) 
Moderate                    2018                

 

 Visit Plan:                     Hyperlipidemia - pt has been counseled about 
appropriate diet, exercise, and need for low fat food choices. I have discussed 
the need for the patient to take medications as prescribed. If the patient has 
negative side effects from the medication, they are to CALL the office and not 
abruptly discontinue the medication without discussion with a practitioner in 
the office. We will check labs in 3-6 months for follow up on the patient's 
chronic medical problem and to assure normal liver response to medications. 
Actinic keratosis - rx for efudex

                                                             2017        
        

 

 Appointment: aKtt Garcia 

WPtel:[phone]

 [address] [facility]

                                                             (15 min) 
Moderate                    2017                

 

 Patient Education: Patient Medication Summary                                 
                            Completed                    2017            
    

 

 Visit Plan:                     Well Adult - pt was counseled about diet, 
exercise, and encouraged to follow a heart healthy diet and increase activity 
level. The patient was instructed to RTC yearly for well adult exams and PRN 
for acute illnesses. The pt was also instructed to have yearly labs for check 
of cholesterol, thyroid, chem panel, CBC, and renal functioning.

                                                             2017        
        

 

 Appointment: Katt Garcia 

WPtel:[phone]

 [address] [facility]

[place]                                                             (15 min) 
Moderate                    2017                

 

 Patient Education: Patient Medication Summary                                 
                            Completed                    2017            
    

 

 Visit Plan:                     Well Adult - pt was counseled about diet, 
exercise, and encouraged to follow a heart healthy diet and increase activity 
level. The patient was instructed to RTC yearly for well adult exams and PRN 
for acute illnesses. The pt was also instructed to have yearly labs for check 
of cholesterol, thyroid, chem panel, CBC, and renal functioning. pt to be seen 
by dr. patel for removal of skin lesion pt to see dr. rasmussen for removal of 
hearing aide cushion from ear.

                                                             2016        
        

 

 Appointment: Katt Garcia 

WPtel:[phone]

 [address] [facility]

                                                             (15 min) 
Moderate                    2016                

 

 Patient Education: Patient Medication Summary                                 
                            Completed                    2016            
    

 

 Visit Plan:                     Well Adult - pt was counseled about diet, 
exercise, and encouraged to follow a heart healthy diet and increase activity 
level. The patient was instructed to RTC yearly for well adult exams and PRN 
for acute illnesses. The pt was also instructed to have yearly labs for check 
of cholesterol, thyroid, chem panel, CBC, and renal functioning. prevnar 13 
shot today

                                                             2016        
        

 

 Patient Education: Patient Medication Summary                                 
                            Completed                    2016            
    

 

 Appointment:                                                              
Nurse Visit                    2015                

 

 Visit Plan:                     Wound Instructions - Pt was instructed to keep 
the wound clean, wash with antibacterial soap, use triple antibiotic ointment, 
call if redness, pustular drainage, or any other acute concerns. Pt to rtc in 
10 days for removal of sutures- skin sent for pathology

                                                             2015        
        

 

 Appointment: Katt Garcia 

WPtel:[phone]

 [facility]

                                                             Surgical 
Procedure                    2015                

 

 Patient Education: Patient Medication Summary                                 
                            Completed                    2015            
    

 

 Visit Plan:                     Well Adult - pt was counseled about diet, 
exercise, and encouraged to follow a heart healthy diet and increase activity 
level. The patient was instructed to RTC yearly for well adult exams and PRN 
for acute illnesses. The pt was also instructed to have yearly labs for check 
of cholesterol, thyroid, chem panel, CBC, and renal functioning. Pt to rtc for 
removal of lesion on left upper arm.

                                                             2015        
        

 

 Appointment: Katt Garcia 

WPtel:[phone]

 [facility]

                                                             Follow up       
             2015                

 

 Patient Education: Patient Medication Summary                                 
                            Completed                    2015            
    

 

 Appointment: Katt Garcia 

WPtel:[phone]

 [facility]

                                                             Follow up       
             2015                

 

 Visit Plan:                     Referral to Dr. Tawil due to elevated PSA - 
number has risen from 5.3 to 6.9 over the past 6 months. Hyperlipidemia - pt 
has been counseled about appropriate diet, exercise, and need for low fat food 
choices. I have discussed the need for the patient to take fish oil. If the 
patient has negative side effects from the medication, they are to CALL the 
office and not abruptly discontinue the medication without discussion with a 
practicioner in the office. We will check labs in 3-6 months for follow up on 
the patient's chronic medical problem and to assure normal liver response to 
medications.

                                                             2014        
        

 

 Appointment: Katt Garcia 

WPtel:[phone]

 [address] [facility]

                                                             Follow up       
             2014                

 

 Patient Education: Patient Medication Summary                                 
                            Completed                    2014            
    

 

 Visit Plan:                     Wound Instructions - Pt was instruced to keep 
the wound clean, wash with antibacterial soap, use triple antibiotic ointment, 
call if redness, pustular drainage, or any other acute conerns.

                                                             2013        
        

 

 Patient Education: Patient Medication Summary                                 
                            Completed                    2013            
    

 

 Visit Plan:                     Hyperlipidemia - pt has been counseled about 
appropriate diet, exercise, and need for low fat food choices. I have discussed 
the need for the patient to take medications as prescribed. If the patient has 
negative side effects from the medication, they are to CALL the office and not 
abruptly discontinue the medication without discussion with a practicioner in 
the office. We will check labs in 3-6 months for follow up on the patient's 
chronic medical problem and to assure normal liver response to medications. 
Darkly pigmented abnormal lesion of right neck/shoulder - recommended removal 
ASAP due to nature of new dark lesion. Recommended pt to start exercise, 
walking a mile a day, then increase up to 2 miles daily for more opimal health 
as he is hesitant to take statins due to history of myalgia. Mild elevation of 
psa - recommended pt to have repeat testing in 6 months. Hearing loss - 
referral to audiologist at Dr. Rasmussen's office

                                                             2013        
        

 

 Appointment: Katt Garcia 

WPtel:[phone]

 [address] [facility]

[place]                                                             Follow up       
             2013                

 

 Patient Education: Patient Medication Summary                                 
                            Completed                    2013            
    

 

 Patient Education: Patient Medication Summary                                 
                            Completed                    2013            
    

 

 Visit Plan:                     URI - Pt advised to increase fluids, vitamin 
C. Discussed natural and expected course of this diagnosis and need to alert me 
if symtpoms do not follow expected course, or if any worse. RX sent to patient'
s pharmacy to start if symptoms persist in the next 3-4 days Burn right forearm-
healing-no s/s of infection-leave open to air-call for s/s of infection

                                                             2012        
        

 

 Appointment: Jackie Hoskins 

WPtel:[phone]

 [address] [facility]

[place]                                                             Sick            
        2012                

 

 Patient Education: Patient Medication Summary                                 
                            Completed                    2012            
    

 

 Visit Plan:                     Renal insufficiency - discussed the pt's 
elevated creatinine and lowered GFR. I have recommended and eval by a 
nephrologist to review the patient's case. Per his wife's report in clinic today
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 Per her 
report, his mom told of a time that he was so sick that the doctor told her to 
prepare for him to pass, he had a severe ear/upper respiratory infection, and 
was sick and weak for months after he finally recovered from his illness when 
he was a child. I have informed Judge Wachter that he should not take 
antiinflammatories, NSAIDS, of any type for pain, if he needs medication, it 
should be tylenol based. I have also informed him to make sure that if he is 
ever given an antibiotic that he informs the doctor that he has decreased 
kidney function. The pt and his wife vocalized understanding. Actinic Keratosis 
- wound Instructions - Pt was instruced to keep the wound clean, wash with 
antibacterial soap, use triple antibiotic ointment, call if redness, pustular 
drainage, or any other acute conerns. We also discussed that he needs to see 
the audiologist in [place] for a hearing screen. We discussed alcohol intake, I 
reminded him that as we age, our bodies produce less of the enzyme needed to 
metabolize alcohol and despite our apparent tolerance, the alcohol lasts longer 
in our systems that we realize, and he needs to decrease his consumption of 
alcohol in the evenings to one glass.

                                                             2012        
        

 

 Appointment: Katt Garcia 

WPtel:[phone]

 [address] [facility]

                                                             Follow up       
             2012                

 

 Patient Education: Patient Medication Summary                                 
                            Completed                    2012            
    

 

 Appointment: Jackie Hoskins 

WPtel:[phone]

 [address] [facility] [address]

                                                             Other           
         2012                

 

 Patient Education: Patient Medication Summary                                 
                            Completed                    2012            
    

 

 Appointment: Gato Jackie 

WPtel:[phone]

 [facility] [address]

                                                             Follow up       
             2012                

 

 Visit Plan:                     Wound Instructions - Pt was instruced to keep 
the wound clean, wash with antibacterial soap, use triple antibiotic ointment, 
call if redness, pustular drainage, or any other acute conerns. right chest 
wall - one lesion cauterized - seb-k left deltoid - 1 lesion excised with 
8mmpunch biopsy 3 sutures left jaw 1 lesion 8mm punch biopsy 3 sutures right 
scapular lesion - 8mm punch, 2 sutures right mid back 8mm punch 2 sutures 
middle lower back 8 mm punch 2 sutures

                                                             2012        
        

 

 Appointment: Katt Garcia 

WPtel:[phone]

 [facility]

                                                             Surgical 
Procedure                    2012                

 

 Patient Education: Patient Medication Summary                                 
                            Completed                    2012            
    

 

 Visit Plan:                     Well Adult - pt was counseled about diet, 
exercise, and encouraged to follow a heart healthy diet and increase acrtivity 
level. The patient was instructed to RTC yearly for well adult exams and PRN 
for acute illnesses. The pt was also instructed to have yearly labs for check 
of cholesterol, thyroid, chem panel, CBC, and renal functioning. Cerumen 
Impaction - The impacted cerumen was removed with the use of the ear currette. 
The patient tolerated the procedure without incident and had improvement in 
hearing. A VERY large amount of dark wax was removed by the practicioner due to 
the wax being more complicated to remove, and staff was needed to assit the 
removal of the wax by holding the ear, and keepig patient stabilized during the 
removal process. Pt had multiple different issues mentioned in clinic today. I 
spent and extensive amount of time in the patient's room today. Hearing Loss - 
I have recommended that the patient have an audiology evaluation by Mrs. Mancini at [facility]. He had improvement in his hearing today, but not as 
much as would be expected after the massive amount of cerumen removed from his 
ears today. Pityriasis Versicolor - Rx for ketaconazole shampoo given to the pt 
today. He is to use as directed. Seborrheic keratosis - irritated of right 
chest wall, will remove with electrocautery at a later date. Skin lesions - 
uncertain eitiology - lesion on left cheek - may be a Lentigo - will have pt 
RTC for biopsy. Lesion on left deltoid - recommend removal, may be a Johnny K, but 
it has an unusual appearance and I would feel more comfortable with a biopsy to 
make sure that it is not a basal cell or squamous cell with an overlying 
plaque. Pt to RTC soon for removal of the lesions. Back pain - recommended 
arches in his shoes - he has VERY high arches, has clawing of his toes and this 
may help alleviate his back pain. Cough - uncertain eitiology - monitor. 
Overweight - pt aware of the weight causing a problem with fatigue, dyspnea - 
recommended to start exercise program and cut back on night-time alcohol 
consumption and also recommended caloric restriction/monitoring.

                                                             2012        
        

 

 Appointment: Katt Garcia 

WPtel:[phone]

 [address] [place], [place] [address]

                                                             New Patient     
               2012                

 

 Patient Education: Patient Medication Summary                                 
                            Completed                    2012            
    



                                                                               
                                                                               
                                                                               
                                                                               
                                                                               
                                 



Instructions

                      





 Comment                

 

                     watch the skin on the right lower jaw below right ear.

 . Well Adult - pt was counseled about diet, exercise, and encouraged to follow 
a heart healthy diet and increase activity level.  The patient was instructed 
to RTC yearly for well adult exams and PRN for acute illnesses.  The pt was 
also instructed to have yearly labs for check of cholesterol, thyroid, chem 
panel, CBC, and renal functioning.



prevnar 13 shot today

                                  

 

                     . Renal insufficiency - discussed the pt's elevated 
creatinine and lowered GFR.  I have recommended and eval by a nephrologist to 
review the patient's case.  Per his wife's report in clinic today, she thinks 
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  Per her report, his mom 
told of a time that he was so sick that the doctor told her to prepare for him 
to pass, he had a severe ear/upper respiratory infection, and was sick and weak 
for months after he finally recovered from his illness when he was a child.

I have informed Judge Wachter that he should not take antiinflammatories, NSAIDS
, of any type for pain, if he needs medication, it should be tylenol based.  I 
have also informed him to make sure that if he is ever given an antibiotic that 
he informs the doctor that he has decreased kidney function.

The pt and his wife vocalized understanding.



Actinic Keratosis - wound Instructions - Pt was instruced to keep the wound 
clean, wash with antibacterial soap, use triple antibiotic ointment, call if 
redness, pustular drainage, or any other acute conerns.



We also discussed that he needs to see the audiologist in [place] for a hearing 
screen.



We discussed alcohol intake, I reminded him that as we age, our bodies produce 
less of the enzyme needed to metabolize alcohol and despite our apparent 
tolerance, the alcohol lasts longer in our systems that we realize, and he 
needs to decrease his consumption of alcohol in the evenings to one glass.     
                             

 

                     . URI - Pt advised to increase fluids, vitamin C.  
Discussed natural and expected course of this diagnosis and need to alert me if 
symtpoms do not follow expected course, or if any worse. RX sent to patient's 
pharmacy to start if symptoms persist in the next 3-4 days



Burn right forearm-healing-no s/s of infection-leave open to air-call for s/s 
of infection                                  

 

                     neosporin to the left ear

 . Well Adult - pt was counseled about diet, exercise, and encouraged to follow 
a heart healthy diet and increase activity level.  The patient was instructed 
to RTC yearly for well adult exams and PRN for acute illnesses.  The pt was 
also instructed to have yearly labs for check of cholesterol, thyroid, chem 
panel, CBC, and renal functioning.



Pt to rtc for removal of lesion on left upper arm.

                                  

 

                     . Wound Instructions - Pt was instructed to keep the wound 
clean, wash with antibacterial soap, use triple antibiotic ointment, call if 
redness, pustular drainage, or any other acute concerns.

Pt to rtc in 10 days for removal of sutures- skin sent for pathology

                                  

 

                     . Well Adult - pt was counseled about diet, exercise, and 
encouraged to follow a heart healthy diet and increase activity level.  The 
patient was instructed to RTC yearly for well adult exams and PRN for acute 
illnesses.  The pt was also instructed to have yearly labs for check of 
cholesterol, thyroid, chem panel, CBC, and renal functioning.

                                  

 

                     . Well Adult - pt was counseled about diet, exercise, and 
encouraged to follow a heart healthy diet and increase activity level.  The 
patient was instructed to RTC yearly for well adult exams and PRN for acute 
illnesses.  The pt was also instructed to have yearly labs for check of 
cholesterol, thyroid, chem panel, CBC, and renal functioning.



pt to be seen by dr. patel for removal of skin lesion



pt to see dr. rasmussen for removal of hearing aide cushion from ear.

                                  

 

                     .  Wound Instructions - Pt was instruced to keep the wound 
clean, wash with antibacterial soap, use triple antibiotic ointment, call if 
redness, pustular drainage, or any other acute conerns.

right chest wall - one lesion cauterized - seb-k

left deltoid - 1 lesion excised with 8mmpunch biopsy 3 sutures

left jaw 1 lesion 8mm punch biopsy 3 sutures

right scapular lesion - 8mm punch, 2 sutures

right mid back 8mm punch 2 sutures

middle lower back 8 mm punch 2 sutures                                  

 

                     Recommended appt with Dr. Tawil - pt to call for appt due 
to complexity of his schedule.

Pt recommended to start on lower fat diet and to increase exercise.. Referral 
to Dr. Tawil due to elevated PSA - number has risen from 5.3 to 6.9 over the 
past 6 months.



 Hyperlipidemia -  pt has been counseled about appropriate diet, exercise, and 
need for low fat food choices.  I have discussed the need for the patient to 
take fish oil.   If the patient has negative side effects from the medication, 
they are to CALL the office and not abruptly discontinue the medication without 
discussion with a practicioner in the office.  We will check labs in 3-6 months 
for follow up on the patient's chronic medical problem and to assure normal 
liver response to medications.                                  

 

                     efudex - cream - use twice daily on the skin lesions that 
are white on the left ear, arms, legs - use for 10 days - then stop and use 
neosporin on the lesions until they heal

 . Hyperlipidemia -  pt has been counseled about appropriate diet, exercise, 
and need for low fat food choices.  I have discussed the need for the patient 
to take medications as prescribed. If the patient has negative side effects 
from the medication, they are to CALL the office and not abruptly discontinue 
the medication without discussion with a practitioner in the office.  We will 
check labs in 3-6 months for follow up on the patient's chronic medical problem 
and to assure normal liver response to medications.



Actinic keratosis - rx for efudex

                                  

 

                     . Hyperlipidemia - monitor labs - pt has been able to 
adjust his diet and improve his cholesterol without medication.



Hx of skin cancer - no new lesions today- monitor - call if new skin changes 
occur.



Prostate cancer - keep appts with [facility] for intermittent monitoring.



Vitamin D deficiency - repeat labs today.                                  

 

                     . Wound Instructions - Pt was instruced to keep the wound 
clean, wash with antibacterial soap, use triple antibiotic ointment, call if 
redness, pustular drainage, or any other acute conerns.                        
          

 

                     . Well Adult - pt was counseled about diet, exercise, and 
encouraged to follow a heart healthy diet and increase acrtivity level.  The 
patient was instructed to RTC yearly for well adult exams and PRN for acute 
illnesses.  The pt was also instructed to have yearly labs for check of 
cholesterol, thyroid, chem panel, CBC, and renal functioning.



 Cerumen Impaction - The impacted cerumen was removed with the use of the ear 
currette.  The patient tolerated the procedure without incident and had 
improvement in hearing.

A VERY large amount of dark wax was removed by the practicioner due to the wax 
being more complicated to remove, and staff was needed to assit the removal of 
the wax by holding the ear, and keepig patient stabilized during the removal 
process.



Pt had multiple different issues mentioned in clinic today.  I spent and 
extensive amount of time in the patient's room today.



Hearing Loss - I have recommended that the patient have an audiology evaluation 
by Mrs. Mancini at [facility].  He had improvement in his hearing today, but 
not as much as would be expected after the massive amount of cerumen removed 
from his ears today.



Pityriasis Versicolor - Rx for ketaconazole shampoo given to the pt today.  He 
is to use as directed.  



Seborrheic keratosis - irritated of right chest wall, will remove with 
electrocautery at a later date.



Skin lesions - uncertain eitiology - lesion on left cheek - may be a Lentigo - 
will have pt RTC for biopsy.  Lesion on left deltoid - recommend removal, may 
be a Johnny K, but it has an unusual appearance and I would feel more comfortable 
with a biopsy to make sure that it is not a basal cell or squamous cell with an 
overlying plaque.  Pt to RTC soon for removal of the lesions.



Back pain - recommended arches in his shoes - he has VERY high arches, has 
clawing of his toes and this may help alleviate his back pain.



Cough - uncertain eitiology - monitor.



Overweight - pt aware of the weight causing a problem with fatigue, dyspnea - 
recommended to start exercise program and cut back on night-time alcohol 
consumption and also recommended caloric restriction/monitoring.               
                   

 

                     . Hyperlipidemia -  pt has been counseled about 
appropriate diet, exercise, and need for low fat food choices.  I have 
discussed the need for the patient to take medications as prescribed. If the 
patient has negative side effects from the medication, they are to CALL the 
office and not abruptly discontinue the medication without discussion with a 
practicioner in the office.  We will check labs in 3-6 months for follow up on 
the patient's chronic medical problem and to assure normal liver response to 
medications.



Darkly pigmented abnormal lesion of right neck/shoulder - recommended removal 
ASAP due to nature of new dark lesion.



Recommended pt to start exercise, walking a mile a day, then increase up to 2 
miles daily for more opimal health as he is hesitant to take statins due to 
history of myalgia.



Mild elevation of psa - recommended pt to have repeat testing in 6 months.



Hearing loss - referral to audiologist at Dr. Rasmussen's office

## 2018-08-17 NOTE — IMPLANTATION OF LOOP MONITOR
Implant of Loop Monitior


IMPLANTATION OF LOOP MONITOR REPORT


 


DATE OF PROCEDURE:  8/17/18 





PREOP DIAGNOSIS:


atrial flutter





POSTOP DIAGNOSIS:


atrial flutter


 


PROCEDURE DETAILS:


The patient is a 77  male with history of paroxysmal atrial fibrillation 

requiring long-term surveillance. Therefore implantable loop recorder was 

discussed and agreed with the patient. Informed consent was taken. All risks 

and complications were discussed at length. The patient was draped and prepped 

in the usual sterile fashion. Local anesthesia was lidocaine, which was given 

in the substernal area close to the 4th intercostal space.  Loop monitor 

Medtronic with serial number [id number] was implanted according to the 

protocol. Steri-Strips were placed at the end of the procedure. There were no 

complications and the patient tolerated the procedure well. The device was 

interrogated with a voltage of.  





ANESTHESIA:


Local anesthesia with lidocaine.





COMPLICATIONS:


None





CONTRAST/FLUOROSCOPY: 


None


 


CONCLUSION:


successful reveal device implantation with no complication





FINAL DIAGNOSIS:


Chronic atrial flutter


Palpitation


Dizziness


Hypertension











DOMINIQUE TANG MD Aug 17, 2018 11:10

## 2018-08-17 NOTE — XMS REPORT
Encounter Summary

 Created on: 2018



Wachter, Ariel SUSHIL Laboy

External Reference #: [id number]

: 1940

Sex: Male



Demographics







 Address  [address] 

[place]  [address]

 

 Home Phone  [phone]

 

 Preferred Language  English

 

 Marital Status  Unknown

 

 Mu-ism Affiliation  CAT

 

 Race  White

 

 Ethnic Group  Not  or 





Author







 Author  [facility]

 

 Organization  [facility]

 

 Address  Unknown

 

 Phone  Unavailable







Support







 Name  Relationship  Address  Phone

 

 Wachter,Mary Susan  ECON  Unknown  [phone]







Care Team Providers







 Care Team Member Name  Role  Phone

 

 SUSHIL Breen MD  Unavailable  [phone]

 

 Walt Malagon PA-C  Unavailable  [phone]

 

 Katt Garcia MD  PCP  [phone]

 

 Yasmine Caballero MD  Unavailable  [phone]







Reason for Visit

* 





 



  Reason   Comments

 

 



  Other   Lab Results









Encounter Details







    



  Date   Type   Department   Care Team   Description

 

    



  2018   Telephone   [facility]   Tere Son MBBS   Other (
Lab Results )



    Physicians - Internal   [address] 



    Medicine   [address] 



    Ortho and [place] [address] 



    [address]   [phone] 



    [address] [facility]   [phone] (Fax) 



    [place]  



    [address] [phone]  







Social History







    



  Tobacco Use   Types   Packs/Day   Years Used   Date

 

    



  Never Smoker   Cigars   

 

    



  Smokeless Tobacco: Former   Chew    Quit:



  User     2010









 Comments: Rare cigar w/ golf game









   



  Alcohol Use   Drinks/Week   oz/Week   Comments

 

   



  Yes   15 Standard   7.5 



   drinks or  



   equivalent  









 



  Sex Assigned at Birth   Date Recorded

 

 



  Not on file 



as of this encounter



Miscellaneous Notes

* Telephone Encounter - Zeus Scanlon LPN - 2018  4:03 PM CDT



Called patient. 

Spoke with patient's wife. 

She stated patient has not been feeling well lately. 

Patient has had low BP and elevated pulse.

He has also been drinking plenty of water. 

Patient requested BMP order to be mailed.

Ordered BMP. 

Mailed today.

 

* Telephone Encounter - Zeus Scnalon LPN - 2018  3:59 PM CDT



----- Message from KELLY Boles sent at 2018  3:08 PM CDT -----

Cr up to 1.9.



Plan to repeat BMP in 2-3 weeks.



Please check with him if anything is different. Please also make sure he is 
avoiding NSAIDs.

in this encounter



Plan of Treatment





Not on fileas of this encounter



Results

* BASIC METABOLIC PANEL (2018)





   



  Component   Value   Ref Range   Performed At

 

   



  Sodium   140    [facility]

 

   



  Potassium   4.1    [facility]

 

   



  Chloride   105    [facility]

 

   



  CO2   27    [facility]

 

   



  Blood Urea Nitrogen   26    [facility]

 

   



  Creatinine   1.6 (H)    [facility]

 

   



  Glucose   86    [facility]

 

   



  Calcium   8.8    [facility]

 

   



  eGFR Non    45 (L)    [facility]

 

   



  eGFR      [facility]

 

   



  Anion Gap     [facility]













  Specimen

 





  Blood - Blood









 



  Narrative   Performed At

 

 



  This result has an attachment that is not available. 









   



  Performing Organization   Address   City/State/Zipcode   Phone Number

 

   



  [facility]   





in this encounter



Visit Diagnoses











  Diagnosis

 





  Chronic kidney disease, unspecified CKD stage - Primary

## 2018-08-17 NOTE — XMS REPORT
Encounter Summary

 Created on: 2018



Wachter, Andrew J Jr

External Reference #: [id number]

: 1940

Sex: Male



Demographics







 Address  [address] 

[place]  [address]

 

 Home Phone  [phone]

 

 Preferred Language  English

 

 Marital Status  Unknown

 

 Catholic Affiliation  CAT

 

 Race  White

 

 Ethnic Group  Not  or 





Author







 Author  [facility]

 

 Organization  [facility]

 

 Address  Unknown

 

 Phone  Unavailable







Support







 Name  Relationship  Address  Phone

 

 Wachter,Mary Susan  ECON  Unknown  [phone]







Care Team Providers







 Care Team Member Name  Role  Phone

 

 SUSHIL Breen MD  Unavailable  [phone]

 

 Walt Malagon PA-C  Unavailable  [phone]

 

 Katt Garcia MD  PCP  [phone]

 

 Yasmine Caballero MD  Unavailable  [phone]







Encounter Details







    



  Date   Type   Department   Care Team   Description

 

    



  2018   Orders Only   [facility]   Tere Son MBBS 



    Physicians - Internal   [address] 



    Medicine   [address] 



    Ortho [place] [address] 



    [address]   [phone] 



     [facility]   [phone] (Fax) 



    [place]  



    [address] [phone]  







Social History







    



  Tobacco Use   Types   Packs/Day   Years Used   Date

 

    



  Never Smoker   Cigars   

 

    



  Smokeless Tobacco: Former   Chew    Quit:



  User     2010









 Comments: Rare cigar w/ golf game









   



  Alcohol Use   Drinks/Week   oz/Week   Comments

 

   



  Yes   15 Standard   7.5 



   drinks or  



   equivalent  









 



  Sex Assigned at Birth   Date Recorded

 

 



  Not on file 



as of this encounter



Plan of Treatment





Not on fileas of this encounter



Results

* CBC AND DIFF (2018)





   



  Component   Value   Ref Range   Performed At

 

   



  White Blood Cells   5.99    [facility]

 

   



  RBC   4.17    [facility]

 

   



  Hemoglobin   13.6    [facility]

 

   



  Hematocrit   41.3    [facility]

 

   



  MCV   99.0    [facility]

 

   



  MCH   32.6    [facility]

 

   



  MCHC   32.9    [facility]

 

   



  Platelet Count   235    [facility]

 

   



  RDW   13.3    [facility]

 

   



  Neutrophils   59.7    [facility]

 

   



  Absolute Neutrophil Count   3.57    [facility]

 

   



  Lymphocytes   26.9    [facility]

 

   



  Absolute Lymph Count   1.61    [facility]

 

   



  Monocytes   9.3    [facility]

 

   



  Absolute Monocyte Count   0.6    [facility]

 

   



  Eosinophil   3.3    [facility]

 

   



  Absolute Eosinophil Count   0.2    [facility]

 

   



  Basophils   0.8    [facility]

 

   



  Absolute Basophil Count   0.1    [facility]













  Specimen

 





  Blood - Blood









 



  Narrative   Performed At

 

 



  This result has an attachment that is not available. 









   



  Performing Organization   Address   City/State/Zipcode   Phone Number

 

   



  [facility]   





* 25-OH VITAMIN D (D2 + D3) (2018)





   



  Component   Value   Ref Range   Performed At

 

   



  Vitamin D(25-OH)Total   55.23    [facility]













  Specimen

 





  Blood - Blood









 



  Narrative   Performed At

 

 



  This result has an attachment that is not available. 









   



  Performing Organization   Address   City/State/Zipcode   Phone Number

 

   



  [facility]   





* COMPREHENSIVE METABOLIC PANEL (2018)





   



  Component   Value   Ref Range   Performed At

 

   



  Sodium   139    [facility]

 

   



  Potassium   4.0    [facility]

 

   



  Chloride   102    [facility]

 

   



  CO2   29.0    [facility]

 

   



  Blood Urea Nitrogen   28    [facility]

 

   



  Creatinine   1.9    [facility]

 

   



  Glucose   102    [facility]

 

   



  Calcium   9.1    [facility]

 

   



  Total Protein   6.8    [facility]

 

   



  Total Bilirubin   0.6    [facility]

 

   



  Albumin   4.1    [facility]

 

   



  Alk Phosphatase   49    [facility]

 

   



  AST (SGOT)   29    [facility]

 

   



  ALT (SGPT)   29    [facility]

 

   



  eGFR Non    38    [facility]

 

   



  eGFR      [facility]

 

   



  Anion Gap   12    [facility]













  Specimen

 





  Blood - Blood









 



  Narrative   Performed At

 

 



  This result has an attachment that is not available. 









   



  Performing Organization   Address   City/State/Zipcode   Phone Number

 

   



  [facility]   





in this encounter



Visit Diagnoses

Not on filein this encounter

## 2019-08-13 NOTE — HISTORY AND PHYSICAL
DATE OF SERVICE:  



PANENDOSCOPY HISTORY AND PHYSICAL



HISTORY OF PRESENT ILLNESS:

The patient is a 78-year-old white male referred by Dr. Garcia for diagnostic

panendoscopy due to history of dysphagia as well as diarrhea.  The patient

reports for the past several years.  He has had intermittent dysphagia to solids

and intermittent heartburn.  He has not been taking any medication.  He reports

2 episodes where he had to regurgitate meat that likely had been not well

chewed.  He will have an episode once a month and it has been a little more

frequent over the past six months where he will have a sticking sensation with

solid food that he will sip little water to have relief of his symptoms.  For a

while he stated that he was taking iron and he was feeling weak and had dark

stools at that time, it was prescribed by Dr. Garcia.  He had a loop recorder

implanted, but it did not reveal any evidence for significant heart arrhythmia

and they ended up discontinuing his metoprolol and he has felt better from an

energy standpoint since this was stopped in 05/2019.  He reports that he has

lost about 7 pounds over the past 3 or 4 months.  He denies associated abdominal

pain, but he has noted some stool urgency typically after meals or after he eats

out.  He has had no reported antibiotic therapy and bowel habit change with

stool urgency as intermittent.  He has not had any reported bright red blood per

rectum.  He occasionally has had this in the past, although it has been sometime

with known small hemorrhoid on his last colonoscopy in 2014, performed by

myself.  At that time, he had no evidence for neoplasia.



PAST MEDICAL HISTORY:

Significant for colon polyps, although he had no evidence for neoplasia on his

last colonoscopy 5 years ago in 03/2014.  He has had no previous EGD evaluation.

 He is currently taking an aspirin three times weekly as daily use caused

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

sleep apnea on CPAP treatments and takes Benadryl 25 mg at bedtime for insomnia.

 He is on vitamin D, folic acid and another B complex vitamin.  Reports no other

over-the-counter medication use.



He has had past history of prostate cancer and only surgery was robotic

prostatectomy roughly three or four years ago.  PSAs have been zero since per

his report.



FAMILY HISTORY:

Pertinent for a grandfather and grandmother in her 70s who succumbed to colon

cancer.  He had 2 uncles with colon cancer as well also later in life.



SOCIAL HISTORY:

He has a past history of smokeless tobacco use, but quit roughly 10 years ago

with no past smoking history other than an occasional cigar.  He has 1 to 2

mixed drinks most nights of the week.



PHYSICAL EXAMINATION:

GENERAL:  Reveals a well-appearing white male in no acute distress.  No evidence

for pallor is noted.

VITAL SIGNS:  Weight 210 pounds, it is down 7 pounds from our last office weight

in 03/2014.  Blood pressure 122/76.

HEENT:  Unremarkable.  Sclerae nonicteric.  No pallor noted.

CHEST:  Clear to auscultation.

CARDIOVASCULAR:  Reveals a regular rate and rhythm without murmur, S3 or S4.

ABDOMEN:  Soft, supple without mass, organomegaly or tenderness.

EXTREMITIES:  Reveal no cyanosis, clubbing or edema.



ASSESSMENT AND PLAN:

The patient was set up for diagnostic panendoscopy due to history of dysphagia

with weight loss and colonoscopy due to bowel habit change with stool urgency

and loose stools, although he does not meet technical criteria for diarrhea. 

There is also a strong family history for colon cancer and has been well over 5

years since his last colonoscopy.



I thank you for the referral.





Job ID: [id number]

DocumentID: [id number]

Dictated Date:  08/08/2019 11:59:56

Transcription Date: 08/08/2019 12:36:33

Dictated By: NEELA RUANO MD

[facility]

## 2019-08-19 ENCOUNTER — HOSPITAL ENCOUNTER (OUTPATIENT)
Dept: HOSPITAL 75 - PREOP | Age: 79
Discharge: HOME | End: 2019-08-19
Attending: INTERNAL MEDICINE
Payer: MEDICARE

## 2019-08-19 VITALS — WEIGHT: 211 LBS | BODY MASS INDEX: 28.58 KG/M2 | HEIGHT: 72 IN

## 2019-08-19 DIAGNOSIS — Z01.818: Primary | ICD-10-CM

## 2019-08-23 ENCOUNTER — HOSPITAL ENCOUNTER (OUTPATIENT)
Dept: HOSPITAL 75 - ENDO | Age: 79
Discharge: HOME | End: 2019-08-23
Attending: INTERNAL MEDICINE
Payer: MEDICARE

## 2019-08-23 VITALS — DIASTOLIC BLOOD PRESSURE: 95 MMHG | SYSTOLIC BLOOD PRESSURE: 170 MMHG

## 2019-08-23 VITALS — SYSTOLIC BLOOD PRESSURE: 154 MMHG | DIASTOLIC BLOOD PRESSURE: 76 MMHG

## 2019-08-23 VITALS — DIASTOLIC BLOOD PRESSURE: 61 MMHG | SYSTOLIC BLOOD PRESSURE: 99 MMHG

## 2019-08-23 VITALS — SYSTOLIC BLOOD PRESSURE: 92 MMHG | DIASTOLIC BLOOD PRESSURE: 54 MMHG

## 2019-08-23 VITALS — SYSTOLIC BLOOD PRESSURE: 118 MMHG | DIASTOLIC BLOOD PRESSURE: 68 MMHG

## 2019-08-23 VITALS — DIASTOLIC BLOOD PRESSURE: 67 MMHG | SYSTOLIC BLOOD PRESSURE: 115 MMHG

## 2019-08-23 VITALS — SYSTOLIC BLOOD PRESSURE: 110 MMHG | DIASTOLIC BLOOD PRESSURE: 64 MMHG

## 2019-08-23 VITALS — SYSTOLIC BLOOD PRESSURE: 107 MMHG | DIASTOLIC BLOOD PRESSURE: 63 MMHG

## 2019-08-23 VITALS — SYSTOLIC BLOOD PRESSURE: 92 MMHG | DIASTOLIC BLOOD PRESSURE: 51 MMHG

## 2019-08-23 VITALS — SYSTOLIC BLOOD PRESSURE: 142 MMHG | DIASTOLIC BLOOD PRESSURE: 80 MMHG

## 2019-08-23 VITALS — SYSTOLIC BLOOD PRESSURE: 150 MMHG | DIASTOLIC BLOOD PRESSURE: 86 MMHG

## 2019-08-23 VITALS — DIASTOLIC BLOOD PRESSURE: 68 MMHG | SYSTOLIC BLOOD PRESSURE: 118 MMHG

## 2019-08-23 VITALS — HEIGHT: 72 IN | BODY MASS INDEX: 28.58 KG/M2 | WEIGHT: 211 LBS

## 2019-08-23 VITALS — DIASTOLIC BLOOD PRESSURE: 77 MMHG | SYSTOLIC BLOOD PRESSURE: 140 MMHG

## 2019-08-23 VITALS — DIASTOLIC BLOOD PRESSURE: 56 MMHG | SYSTOLIC BLOOD PRESSURE: 89 MMHG

## 2019-08-23 VITALS — SYSTOLIC BLOOD PRESSURE: 135 MMHG | DIASTOLIC BLOOD PRESSURE: 79 MMHG

## 2019-08-23 VITALS — SYSTOLIC BLOOD PRESSURE: 149 MMHG | DIASTOLIC BLOOD PRESSURE: 80 MMHG

## 2019-08-23 VITALS — DIASTOLIC BLOOD PRESSURE: 65 MMHG | SYSTOLIC BLOOD PRESSURE: 135 MMHG

## 2019-08-23 VITALS — DIASTOLIC BLOOD PRESSURE: 70 MMHG | SYSTOLIC BLOOD PRESSURE: 125 MMHG

## 2019-08-23 VITALS — SYSTOLIC BLOOD PRESSURE: 93 MMHG | DIASTOLIC BLOOD PRESSURE: 57 MMHG

## 2019-08-23 DIAGNOSIS — Z12.11: Primary | ICD-10-CM

## 2019-08-23 DIAGNOSIS — R63.4: ICD-10-CM

## 2019-08-23 DIAGNOSIS — G47.33: ICD-10-CM

## 2019-08-23 DIAGNOSIS — Z85.46: ICD-10-CM

## 2019-08-23 DIAGNOSIS — Z79.82: ICD-10-CM

## 2019-08-23 DIAGNOSIS — Z87.891: ICD-10-CM

## 2019-08-23 DIAGNOSIS — Z90.79: ICD-10-CM

## 2019-08-23 DIAGNOSIS — K57.30: ICD-10-CM

## 2019-08-23 DIAGNOSIS — K22.2: ICD-10-CM

## 2019-08-23 DIAGNOSIS — K22.10: ICD-10-CM

## 2019-08-23 DIAGNOSIS — Z87.19: ICD-10-CM

## 2019-08-23 DIAGNOSIS — K31.89: ICD-10-CM

## 2019-08-23 DIAGNOSIS — Z80.0: ICD-10-CM

## 2019-08-23 DIAGNOSIS — K63.5: ICD-10-CM

## 2019-08-23 PROCEDURE — 88305 TISSUE EXAM BY PATHOLOGIST: CPT

## 2019-08-23 NOTE — OPERATIVE REPORT
DATE OF SERVICE:  08/23/2019



PANENDOSCOPY SUMMARY



INDICATION FOR THE PROCEDURE:

The patient underwent surveillance colonoscopy due to past history of colon

polyps and diagnostic EGD due to history of dysphagia and weight loss.



The patient was placed in left lateral decubitus position.  Prior to undergoing

colonoscopy, digital rectal evaluation was performed.  Anal sphincter tone was

normal.  Perianal reflex was intact.  There are some redundant perianal skin

folds, possibly due to previous hemorrhoids, but no active internal or external

hemorrhoids were noted.  The prostate was surgically absent.  No nodularity was

noted.  No other abnormalities were noted on digital inspection of the anal

canal or distal rectal vault.  The colonoscope was then inserted into the rectum

and under direct visualization advanced to the cecum.  The cecum was identified

by identification of the ileocecal valve and cecal strap.  Photographic

documentation was obtained.  A careful inspection was made as the colonoscope

was withdrawn.  The patient tolerated the procedure well and the quality of prep

was good.



FINDINGS:

There was no evidence for internal or external hemorrhoids and the rectum was

unremarkable.  A moderate number of small to medium size sigmoid diverticulum

were present without evidence for diverticulitis.  Present in the proximal

descending colon was a diminutive sessile polyp that was biopsied and ablated

and submitted for histopathology with no blood loss.  The splenic flexure,

transverse colon, hepatic flexure, ascending colon and cecum were unremarkable.



ASSESSMENT:

1.  Diminutive proximal descending colonic polyp was removed via hot forceps as

noted above.  As long as there are no surprises on histopathology report, I

would advocate repeat surveillance colonoscopy in five years considering family

history.

2.  Moderate diverticular disease confined to the sigmoid colon was present

without evidence for diverticulitis.

3.  There is surgical absence of the prostate with no nodularity being noted on

digital evaluation of the rectal vault.  We then proceeded with diagnostic EGD.



The endoscope was inserted in the oral cavity and under direct visualization,

the esophagus was intubated.  The endoscope was passed down the esophagus

through the stomach and second portion of the duodenum.  A careful inspection

was made as the endoscope was withdrawn.  The patient tolerated the procedure

well.



FINDINGS:

The posterior hypopharynx, epiglottis, true and false vocal folds and arytenoid

aperture were unremarkable to visual inspection.  The proximal and mid esophagus

were unremarkable.  The distal esophagus revealed some parenteral ulceration

with evidence for small stricture.  There appeared to be patency at the level of

the lower esophageal sphincter ulceration, was pretty much confined at the

Z-line only.  There was no evidence for hiatal hernia.  The cardia and fundus of

the stomach were unremarkable.  There is some mild antral erythema with a small

duodenal bulb erosion.  Biopsy was obtained from the antrum and submitted for

Helicobacter.  The second portion of the duodenum was unremarkable, but as was

the pylorus and the pyloric channel.



ASSESSMENT:

LA grade D erosive esophagitis with stricturing, but no visible evidence to

suggest malignancy was noted.  Biopsies were obtained from the Z-line and then

the patient underwent balloon dilatation to 60-German size/6 atmospheres with no

pain and was discharged with stable vital signs voicing no complaints.  The

patient denies a history of heartburn symptoms, but dysphagia to solids.  We

discussed the fact that symptoms in regards to heartburn would not be admitted. 

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

indefinite proton pump inhibitor therapy.  I took the liberty of calling out

pantoprazole 40 mg to be taken daily #30, but no refills.  He will need to

obtain refills through his primary care provider, Dr. Garcia.  This discussion

was had with his wife present as he was still likely under the influence of

Versed.  We will await histopathology report, but as long as there is no

evidence for Marquez's change, which did not appear to be present to gross

inspection, would only recommend EGD for recurrence of dysphagia.



The patient did have one erosion of the duodenal bulb, most likely aspirin

related.  There was some mild erythema noted in the antrum, so biopsy was

obtained for histopathology and Helicobacter evaluation.



I thank you for the referral of this pleasant gentleman.



Sincerely,





Job ID: [id number]

DocumentID: [id number]

Dictated Date:  08/23/2019 11:22:35

Transcription Date: 08/23/2019 15:12:47

Dictated By: NEELA RUANO MD

## 2019-09-03 NOTE — OPERATIVE REPORT
DATE OF SERVICE:  



ADDENDUM



Colonoscopy that was done on the 08/23/2019.



Dear Dr. Garcia,



This letter regards pathology report from Mr. Wachter's recent panendoscopy.  
His                             colon polyp was hyperplastic in nature. Because 
of family history I have recommended a 5-year

surveillance colonoscopy interval again.  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

with short segment Marquez's.  There was no evidence for dysplasia, so he should
be at

lower risk for esophageal cancer.  He was started on pantoprazole 40 mg daily at
the time of his

procedure as he had some mild erosive changes and was advised because of

Marquez's that he stay on this indefinitely.  I have asked him to get refills

through your office.  In regards to other risk factor modification, he does not

smoke, but is likely consuming alcohol in higher quantity than considered to be

moderate.  Advised that he at minimum decrease his alcohol intake to 2 ounces of

scotch per day maximum or give it up altogether.  Discussed need for

surveillance EGD in 1 year and he has been placed on callback list by our

office.



I thank you for the referral of this pleasant gentleman.





Job ID: [id number]

DocumentID: [id number]

Dictated Date:  09/03/2019 16:30:54

Transcription Date: 09/03/2019 21:30:07

Dictated By: NEELA RUANO MD

[facility]

## 2019-12-19 ENCOUNTER — HOSPITAL ENCOUNTER (OUTPATIENT)
Dept: HOSPITAL 75 - RAD | Age: 79
End: 2019-12-19
Attending: FAMILY MEDICINE
Payer: MEDICARE

## 2019-12-19 DIAGNOSIS — M47.814: Primary | ICD-10-CM

## 2019-12-19 PROCEDURE — 72072 X-RAY EXAM THORAC SPINE 3VWS: CPT

## 2019-12-19 NOTE — DIAGNOSTIC IMAGING REPORT
INDICATION: Back pain.



TIME OF EXAM: 11:54 a.m.



FINDINGS: Curvature and alignment are normal. There is a

prominent osteophyte laterally to the right in the mid thoracic

spine. Vertebral body heights are well maintained. No fractures

are seen. Paraspinous line is intact.



IMPRESSION: Thoracic spondylosis. No acute bony abnormality is

detected.



Dictated by: 



  Dictated on workstation # [id number]

## 2020-09-15 NOTE — HISTORY AND PHYSICAL
DATE OF SERVICE:  



EGD HISTORY AND PHYSICAL



HISTORY OF PRESENT ILLNESS:

The patient is a 79-year-old white male, referred for surveillance EGD.  One

year ago, he underwent EGD, which revealed evidence for erosive esophagitis with

benign stricture formation.  At that time, he was started on pantoprazole at

bedtime.  He is referred for surveillance EGD.  He did not have evidence for

Marquez's change, but there was a lot of background inflammation.  The patient

reports no dysphagia.  He still has mild chronic cough that is nonproductive. 

Denies any problems of sore throats.  Cough tends to be worse first thing in the

morning, gets better as the day goes on.  He is taking his pantoprazole at

bedtime.  He has had no reported heartburn type symptoms.



PAST MEDICAL HISTORY:

History of colon polyps.  He has a personal history of prostate cancer and only

reported surgery was robotic prostatectomy, roughly five years ago, reportedly

unmeasurable PSA since.



FAMILY HISTORY:

Pertinent for grandfather and grandmother in her 70s who succumbed to colon

cancer.  He has had two uncles with colon cancer as well.  His last colonoscopy

was one year ago at which time, he had no evidence for neoplasia.  Consideration

for repeat colonoscopy in five years.



PHYSICAL EXAMINATION:

GENERAL:  Reveals a white male, appears to be in no acute distress.

VITAL SIGNS:  Blood pressure 140/82, weight 204 pounds compared to a weight last

year of 210 pounds.

HEENT:  Unremarkable.

CHEST:  Clear.

CARDIOVASCULAR:  Regular rate and rhythm without murmur, S3 or S4.

ABDOMEN:  Soft, supple without mass, organomegaly or tenderness.

EXTREMITIES:  Reveal no cyanosis, clubbing or edema.



ASSESSMENT AND PLAN:

The patient was set up for surveillance EGD due to past history of erosive

esophagitis with stricture formation.



I thank you for the referral of this pleasant gentleman.





Job ID: [id number]

DocumentID: [id number]

Dictated Date:  09/03/2020 16:49:30

Transcription Date: 09/03/2020 17:41:41

Dictated By: NEELA RUANO MD

## 2020-09-23 ENCOUNTER — HOSPITAL ENCOUNTER (OUTPATIENT)
Dept: HOSPITAL 75 - PREOP | Age: 80
Discharge: HOME | End: 2020-09-23
Attending: INTERNAL MEDICINE
Payer: MEDICARE

## 2020-09-23 VITALS — HEIGHT: 71.65 IN | BODY MASS INDEX: 28.8 KG/M2 | WEIGHT: 210.32 LBS

## 2020-09-23 DIAGNOSIS — Z01.812: Primary | ICD-10-CM

## 2020-09-23 DIAGNOSIS — Z20.828: ICD-10-CM

## 2020-09-23 PROCEDURE — 87635 SARS-COV-2 COVID-19 AMP PRB: CPT

## 2020-09-25 ENCOUNTER — HOSPITAL ENCOUNTER (OUTPATIENT)
Dept: HOSPITAL 75 - ENDO | Age: 80
Discharge: HOME | End: 2020-09-25
Attending: INTERNAL MEDICINE
Payer: MEDICARE

## 2020-09-25 VITALS — DIASTOLIC BLOOD PRESSURE: 79 MMHG | SYSTOLIC BLOOD PRESSURE: 163 MMHG

## 2020-09-25 VITALS — DIASTOLIC BLOOD PRESSURE: 74 MMHG | SYSTOLIC BLOOD PRESSURE: 155 MMHG

## 2020-09-25 VITALS — WEIGHT: 210.32 LBS | HEIGHT: 71.65 IN | BODY MASS INDEX: 28.8 KG/M2

## 2020-09-25 VITALS — DIASTOLIC BLOOD PRESSURE: 91 MMHG | SYSTOLIC BLOOD PRESSURE: 166 MMHG

## 2020-09-25 VITALS — SYSTOLIC BLOOD PRESSURE: 139 MMHG | DIASTOLIC BLOOD PRESSURE: 59 MMHG

## 2020-09-25 VITALS — DIASTOLIC BLOOD PRESSURE: 53 MMHG | SYSTOLIC BLOOD PRESSURE: 111 MMHG

## 2020-09-25 VITALS — DIASTOLIC BLOOD PRESSURE: 58 MMHG | SYSTOLIC BLOOD PRESSURE: 119 MMHG

## 2020-09-25 VITALS — DIASTOLIC BLOOD PRESSURE: 61 MMHG | SYSTOLIC BLOOD PRESSURE: 127 MMHG

## 2020-09-25 VITALS — SYSTOLIC BLOOD PRESSURE: 160 MMHG | DIASTOLIC BLOOD PRESSURE: 80 MMHG

## 2020-09-25 VITALS — SYSTOLIC BLOOD PRESSURE: 120 MMHG | DIASTOLIC BLOOD PRESSURE: 58 MMHG

## 2020-09-25 DIAGNOSIS — Z85.46: ICD-10-CM

## 2020-09-25 DIAGNOSIS — R05: ICD-10-CM

## 2020-09-25 DIAGNOSIS — K44.9: ICD-10-CM

## 2020-09-25 DIAGNOSIS — Z86.010: ICD-10-CM

## 2020-09-25 DIAGNOSIS — Z80.0: ICD-10-CM

## 2020-09-25 DIAGNOSIS — Z09: Primary | ICD-10-CM

## 2020-09-25 DIAGNOSIS — Z87.19: ICD-10-CM

## 2020-09-25 PROCEDURE — 88305 TISSUE EXAM BY PATHOLOGIST: CPT

## 2020-09-25 NOTE — OPERATIVE REPORT
DATE OF SERVICE:  



EGD SUMMARY



INDICATION FOR THE PROCEDURE:

Erosive esophagitis with chronic cough.



DESCRIPTION OF PROCEDURE:

The patient was placed in the left lateral decubitus position.  The endoscope

was inserted in the oral cavity and under direct visualization, esophagus was

intubated.  Endoscope was passed down the esophagus through stomach and second

portion of the duodenum.  Careful inspection was made as the endoscope was

withdrawn.  The patient tolerated the procedure well.



FINDINGS:

The posterior pharynx, true and false vocal folds and arytenoid aperture were

unremarkable to visual inspection.  Proximal, mid and distal esophagus were

unremarkable.  Previous findings of erosive esophagitis were not present today. 

The Z line was distinct.  There was no evidence for stricture formation and no

obvious evidence to suggest Marquez's change.  A biopsy was obtained and

submitted for histopathology.  The cardia, fundus, antrum, pylorus, pyloric

channel and duodenal bulb were unremarkable.



ASSESSMENT:

Previous findings of erosive esophagitis, stricture formation have resolved. 

The patient does have a small sliding hiatal hernia.  A biopsy from the GE

junction is pending at the time of dictation.  He is reassured by today's

findings and advised to continue pantoprazole, considering his past history.



I thank you for the referral.





Job ID: [id number]

DocumentID: [id number]

Dictated Date:  09/25/2020 11:25:04

Transcription Date: 09/25/2020 16:17:00

Dictated By: NEELA RUANO MD

## 2020-11-10 ENCOUNTER — HOSPITAL ENCOUNTER (OUTPATIENT)
Dept: HOSPITAL 75 - PREOP | Age: 80
Discharge: HOME | End: 2020-11-10
Attending: SPECIALIST
Payer: MEDICARE

## 2020-11-10 VITALS — HEIGHT: 72.01 IN | BODY MASS INDEX: 28.49 KG/M2 | WEIGHT: 210.32 LBS

## 2020-11-10 DIAGNOSIS — Z01.818: Primary | ICD-10-CM

## 2020-11-13 ENCOUNTER — HOSPITAL ENCOUNTER (OUTPATIENT)
Dept: HOSPITAL 75 - SDC | Age: 80
Discharge: HOME | End: 2020-11-13
Attending: SPECIALIST
Payer: MEDICARE

## 2020-11-13 VITALS — WEIGHT: 210.32 LBS | HEIGHT: 72.01 IN | BODY MASS INDEX: 28.49 KG/M2

## 2020-11-13 VITALS — SYSTOLIC BLOOD PRESSURE: 123 MMHG | DIASTOLIC BLOOD PRESSURE: 73 MMHG

## 2020-11-13 DIAGNOSIS — H26.491: Primary | ICD-10-CM

## 2020-11-13 DIAGNOSIS — M25.50: ICD-10-CM

## 2020-11-13 RX ADMIN — TETRACAINE HYDROCHLORIDE PRN ML: 5 SOLUTION OPHTHALMIC at 08:16

## 2020-11-13 RX ADMIN — TETRACAINE HYDROCHLORIDE PRN ML: 5 SOLUTION OPHTHALMIC at 08:20

## 2020-11-13 NOTE — OPHTHALMOLOGY OPERATIVE REPORT
YAG Capsulotomy


PREOPERATIVE DIAGNOSIS:    Secondary Cataract Right Eye


POSTOPERATIVE DIAGNOSIS: Secondary Cataract Right Eye





PROCEDURE: YAG Capsulotomy, right eye





SURGEON: Ted Suarez 





ANESTHESIA: Topical anesthesia





COMPLICATIONS: None





ESTIMATED BLOOD LOSS: Minimal 





DESCRIPTION OF PROCEDURE:


After proper informed consent was obtained, the patient's, a 79 male, right eye 

received one drop of Tropicamide and one drop of Tetracaine. The patient was 

then placed at the YAG laser and using a power of [ 4.0] millijoules and [ 14] 

bursts were used to fashion a central capsulotomy. The patient tolerated the 

procedure well without complications.











TED SUAREZ MD             Nov 13, 2020 09:00

## 2020-11-13 NOTE — OPHTHALMOLOGIST PRE-OP NOTE
Pre-Operative Progress Note


H&P Reviewed


The H&P was reviewed, patient examined and no changes noted.


Date H&P Reviewed:  Nov 13, 2020


Time H&P Reviewed:  08:33


Pre-Op Dx


Secondary Cataract, Right Eye











MAGALYS SUAREZ MD             Nov 13, 2020 08:59

## 2020-11-17 ENCOUNTER — HOSPITAL ENCOUNTER (OUTPATIENT)
Dept: HOSPITAL 75 - PREOP | Age: 80
LOS: 90 days | End: 2021-02-15
Attending: SPECIALIST
Payer: MEDICARE

## 2020-11-17 VITALS — HEIGHT: 72.01 IN | WEIGHT: 210.54 LBS | BODY MASS INDEX: 28.52 KG/M2

## 2020-11-17 DIAGNOSIS — Z01.812: Primary | ICD-10-CM

## 2020-11-17 DIAGNOSIS — H25.9: ICD-10-CM

## 2020-12-09 ENCOUNTER — HOSPITAL ENCOUNTER (OUTPATIENT)
Dept: HOSPITAL 75 - PREOP | Age: 80
Discharge: HOME | End: 2020-12-09
Attending: SPECIALIST
Payer: MEDICARE

## 2020-12-09 VITALS — WEIGHT: 210.32 LBS | BODY MASS INDEX: 28.8 KG/M2 | HEIGHT: 71.65 IN

## 2020-12-09 DIAGNOSIS — Z01.812: Primary | ICD-10-CM

## 2020-12-09 DIAGNOSIS — H26.9: ICD-10-CM

## 2020-12-09 DIAGNOSIS — Z20.828: ICD-10-CM

## 2020-12-09 PROCEDURE — 87635 SARS-COV-2 COVID-19 AMP PRB: CPT

## 2020-12-11 ENCOUNTER — HOSPITAL ENCOUNTER (OUTPATIENT)
Dept: HOSPITAL 75 - SDC | Age: 80
Discharge: HOME | End: 2020-12-11
Attending: SPECIALIST
Payer: MEDICARE

## 2020-12-11 VITALS — HEIGHT: 71.65 IN | WEIGHT: 210.32 LBS | BODY MASS INDEX: 28.8 KG/M2

## 2020-12-11 VITALS — DIASTOLIC BLOOD PRESSURE: 92 MMHG | SYSTOLIC BLOOD PRESSURE: 168 MMHG

## 2020-12-11 VITALS — SYSTOLIC BLOOD PRESSURE: 127 MMHG | DIASTOLIC BLOOD PRESSURE: 89 MMHG

## 2020-12-11 DIAGNOSIS — H25.12: Primary | ICD-10-CM

## 2020-12-11 PROCEDURE — 66984 XCAPSL CTRC RMVL W/O ECP: CPT

## 2020-12-11 RX ADMIN — TETRACAINE HYDROCHLORIDE PRN ML: 5 SOLUTION OPHTHALMIC at 06:59

## 2020-12-11 RX ADMIN — TROPICAMIDE SCH ML: 10 SOLUTION/ DROPS OPHTHALMIC at 07:15

## 2020-12-11 RX ADMIN — TETRACAINE HYDROCHLORIDE PRN ML: 5 SOLUTION OPHTHALMIC at 07:05

## 2020-12-11 RX ADMIN — TROPICAMIDE SCH ML: 10 SOLUTION/ DROPS OPHTHALMIC at 07:10

## 2020-12-11 RX ADMIN — TETRACAINE HYDROCHLORIDE PRN ML: 5 SOLUTION OPHTHALMIC at 07:15

## 2020-12-11 RX ADMIN — TROPICAMIDE SCH ML: 10 SOLUTION/ DROPS OPHTHALMIC at 07:05

## 2020-12-11 RX ADMIN — PHENYLEPHRINE HYDROCHLORIDE SCH ML: 100 SOLUTION/ DROPS OPHTHALMIC at 07:05

## 2020-12-11 RX ADMIN — PHENYLEPHRINE HYDROCHLORIDE SCH ML: 100 SOLUTION/ DROPS OPHTHALMIC at 07:15

## 2020-12-11 RX ADMIN — TETRACAINE HYDROCHLORIDE PRN ML: 5 SOLUTION OPHTHALMIC at 07:10

## 2020-12-11 RX ADMIN — PHENYLEPHRINE HYDROCHLORIDE SCH ML: 100 SOLUTION/ DROPS OPHTHALMIC at 07:10

## 2020-12-11 NOTE — OPHTHALMOLOGY OPERATIVE REPORT
Cataract removal/placement IOL


PREOPERATIVE DIAGNOSIS:    Cataract Left Eye


POSTOPERATIVE DIAGNOSIS: Cataract Left Eye





PROCEDURE: Cataract removal and placement of posterior chamber implant, left eye





SURGEON: Ted Suarez 





ANESTHESIA: Topical with sedation





COMPLICATIONS: None





ESTIMATED BLOOD LOSS: Minimal 





DESCRIPTION OF PROCEDURE:


After proper informed consent was obtained, the patient, a 79 male, was taken to

the Operating Room and the left eye was anesthetized with tetracaine.  The left 

eye was then prepped and draped in the usual manner.  A wire lid speculum was 

placed. A paracentesis was made at the left hand position. Preservative free 

lidocaine was injected into the anterior chamber followed by viscoelastic.  A 

clear corneal incision was made in the temporal position. A capsulorrhexis was 

preformed and the central nuclear and cortical material were removed.  The 

posterior capsule was polished and an Sudhakar 13.5 AU00T0 was placed into the 

capsular bag. The residual viscoelastic was aspirated and balanced saline 

solution was injected into the anterior chamber.  Moxifloxacin was injected into

the anterior chamber.





The wound was checked and found to be water tight.





The patient tolerated the procedure well without complications.











TED SUAREZ MD             Dec 11, 2020 08:09

## 2020-12-11 NOTE — ANESTHESIA-GENERAL POST-OP
MAC


Patient Condition


Mental Status/LOC:  Same as Preop


Cardiovascular:  Satisfactory


Nausea/Vomiting:  Absent


Respiratory:  Satisfactory


Pain:  Controlled


Complications:  Absent





Post Op Complications


Complications


None





Follow Up Care/Instructions


Patient Instructions


None needed.





Anesthesiology Discharge Order


Discharge Order


Patient is doing well, no complaints, stable vital signs, no apparent adverse 

anesthesia problems.   


No complications reported per nursing.











JOSY GUERIN CRNA         Dec 11, 2020 11:25

## 2020-12-11 NOTE — OPHTHALMOLOGIST PRE-OP NOTE
Pre-Operative Progress Note


H&P Reviewed


The H&P was reviewed, patient examined and no changes noted.


Date H&P Reviewed:  Dec 11, 2020


Time H&P Reviewed:  07:49


Pre-Op Dx


Cataract, Left Eye











MAGALYS SUAREZ MD             Dec 11, 2020 07:49

## 2022-11-30 ENCOUNTER — HOSPITAL ENCOUNTER (OUTPATIENT)
Dept: HOSPITAL 75 - REHAB | Age: 82
Discharge: HOME | End: 2022-11-30
Attending: FAMILY MEDICINE
Payer: MEDICARE

## 2022-11-30 DIAGNOSIS — M62.838: ICD-10-CM

## 2022-11-30 DIAGNOSIS — M62.830: Primary | ICD-10-CM

## 2022-12-30 ENCOUNTER — HOSPITAL ENCOUNTER (OUTPATIENT)
Dept: HOSPITAL 75 - REHAB | Age: 82
LOS: 1 days | Discharge: HOME | End: 2022-12-31
Attending: FAMILY MEDICINE
Payer: MEDICARE

## 2022-12-30 DIAGNOSIS — M62.830: Primary | ICD-10-CM

## 2022-12-30 DIAGNOSIS — M62.838: ICD-10-CM

## 2023-02-15 ENCOUNTER — HOSPITAL ENCOUNTER (OUTPATIENT)
Dept: HOSPITAL 75 - REHAB | Age: 83
Discharge: HOME | End: 2023-02-15
Attending: FAMILY MEDICINE
Payer: MEDICARE

## 2023-02-15 DIAGNOSIS — M62.838: ICD-10-CM

## 2023-02-15 DIAGNOSIS — M62.830: Primary | ICD-10-CM

## 2023-03-22 ENCOUNTER — HOSPITAL ENCOUNTER (OUTPATIENT)
Dept: HOSPITAL 75 - RAD | Age: 83
End: 2023-03-22
Attending: INTERNAL MEDICINE
Payer: MEDICARE

## 2023-03-22 DIAGNOSIS — R42: Primary | ICD-10-CM

## 2023-03-22 PROCEDURE — 70553 MRI BRAIN STEM W/O & W/DYE: CPT

## 2023-03-22 NOTE — DIAGNOSTIC IMAGING REPORT
PROCEDURE: MR imaging of the brain with and without contrast.



TECHNIQUE: Multiplanar, multisequence MR imaging of the brain was

performed with and without contrast.



INDICATION:  Dizzy spells.



No prior studies are available for comparison.



Ventricles and sulci are appropriate for the patient's age. There

is moderate periventricular and subcortical white matter changes

noted consistent with chronic microvascular ischemia. No

diffusion restriction is identified to suggest acute ischemia.

The normal expected flow-voids within the carotid siphons are

seen. No acute intra-axial or extra-axial hemorrhage is detected.

Corpus callosum is unremarkable. Sella and parasellar structures

are unremarkable. No abnormal enhancement following contrast

administration is identified.



IMPRESSION: Chronic and senescent changes. No acute intracranial

process is detected.



Dictated by: 



  Dictated on workstation # [id number]